# Patient Record
Sex: FEMALE | Race: WHITE | NOT HISPANIC OR LATINO | Employment: FULL TIME | URBAN - METROPOLITAN AREA
[De-identification: names, ages, dates, MRNs, and addresses within clinical notes are randomized per-mention and may not be internally consistent; named-entity substitution may affect disease eponyms.]

---
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
## 2020-08-03 ENCOUNTER — OFFICE VISIT (OUTPATIENT)
Dept: FAMILY MEDICINE | Facility: CLINIC | Age: 20
End: 2020-08-03
Payer: COMMERCIAL

## 2020-08-03 VITALS
RESPIRATION RATE: 15 BRPM | HEIGHT: 68 IN | BODY MASS INDEX: 27.28 KG/M2 | OXYGEN SATURATION: 98 % | TEMPERATURE: 98 F | WEIGHT: 180 LBS | SYSTOLIC BLOOD PRESSURE: 147 MMHG | HEART RATE: 97 BPM | DIASTOLIC BLOOD PRESSURE: 99 MMHG

## 2020-08-03 DIAGNOSIS — Z76.89 ENCOUNTER TO ESTABLISH CARE: Primary | ICD-10-CM

## 2020-08-03 DIAGNOSIS — Z11.59 ENCOUNTER FOR HEPATITIS C SCREENING TEST FOR LOW RISK PATIENT: ICD-10-CM

## 2020-08-03 DIAGNOSIS — Z11.4 SCREENING FOR HIV (HUMAN IMMUNODEFICIENCY VIRUS): ICD-10-CM

## 2020-08-03 DIAGNOSIS — F41.9 ANXIETY: ICD-10-CM

## 2020-08-03 DIAGNOSIS — J32.9 SINUSITIS, UNSPECIFIED CHRONICITY, UNSPECIFIED LOCATION: ICD-10-CM

## 2020-08-03 PROCEDURE — 99204 PR OFFICE/OUTPT VISIT, NEW, LEVL IV, 45-59 MIN: ICD-10-PCS | Mod: 25,S$GLB,, | Performed by: FAMILY MEDICINE

## 2020-08-03 PROCEDURE — 3008F PR BODY MASS INDEX (BMI) DOCUMENTED: ICD-10-PCS | Mod: CPTII,S$GLB,, | Performed by: FAMILY MEDICINE

## 2020-08-03 PROCEDURE — 96372 THER/PROPH/DIAG INJ SC/IM: CPT | Mod: S$GLB,,, | Performed by: FAMILY MEDICINE

## 2020-08-03 PROCEDURE — 96372 PR INJECTION,THERAP/PROPH/DIAG2ST, IM OR SUBCUT: ICD-10-PCS | Mod: S$GLB,,, | Performed by: FAMILY MEDICINE

## 2020-08-03 PROCEDURE — 3008F BODY MASS INDEX DOCD: CPT | Mod: CPTII,S$GLB,, | Performed by: FAMILY MEDICINE

## 2020-08-03 PROCEDURE — 99204 OFFICE O/P NEW MOD 45 MIN: CPT | Mod: 25,S$GLB,, | Performed by: FAMILY MEDICINE

## 2020-08-03 RX ORDER — DESOGESTREL AND ETHINYL ESTRADIOL 0.15-0.03
0.15 KIT ORAL
COMMUNITY
Start: 2019-09-29 | End: 2024-03-11 | Stop reason: ALTCHOICE

## 2020-08-03 RX ORDER — DICLOFENAC SODIUM 75 MG/1
75 TABLET, DELAYED RELEASE ORAL
COMMUNITY
Start: 2019-08-30 | End: 2020-08-29

## 2020-08-03 RX ORDER — CITALOPRAM 10 MG/1
10 TABLET ORAL DAILY
Qty: 30 TABLET | Refills: 11 | Status: SHIPPED | OUTPATIENT
Start: 2020-08-03 | End: 2022-11-07

## 2020-08-03 RX ORDER — DEXAMETHASONE SODIUM PHOSPHATE 4 MG/ML
4 INJECTION, SOLUTION INTRA-ARTICULAR; INTRALESIONAL; INTRAMUSCULAR; INTRAVENOUS; SOFT TISSUE
Status: COMPLETED | OUTPATIENT
Start: 2020-08-03 | End: 2020-08-03

## 2020-08-03 RX ORDER — SULFAMETHOXAZOLE AND TRIMETHOPRIM 800; 160 MG/1; MG/1
1 TABLET ORAL 2 TIMES DAILY
Qty: 14 TABLET | Refills: 0 | Status: SHIPPED | OUTPATIENT
Start: 2020-08-03 | End: 2020-08-10

## 2020-08-03 RX ADMIN — DEXAMETHASONE SODIUM PHOSPHATE 4 MG: 4 INJECTION, SOLUTION INTRA-ARTICULAR; INTRALESIONAL; INTRAMUSCULAR; INTRAVENOUS; SOFT TISSUE at 01:08

## 2020-08-03 NOTE — PROGRESS NOTES
[facility] - Clinic Note    Subjective      Ms. Anderson is a 20 y.o. female who presents to clinic for [facility] Care and Sinusitis (x1d)    Patient presents to establish care.  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  She has a history of chronic sinusitis.  She had sinus surgery 3 years ago to correct her septum.  The last time she had a sinus infection was November of last year.  She denies any cough or shortness of breath.  Since yesterday she has had increased congestion, facial pain, postnasal drip.  Denies any fever.  Has not been taking any allergy medicine.  Has a history of white coat hypertension.  Her blood pressure at home is in the 120s over 70s.  She does have a history of anxiety.  She has never been diagnosed with anxiety but worries constantly about different things.  Over the last few years it has been worsening.  It has been affecting her day-to-day life.    PMH Estela has a past medical history of Allergy.   PSXH Estela has a past surgical history that includes Tonsillectomy and Adenoidectomy.   CARLY Palmer's family history includes Alcohol abuse in her maternal grandmother; Anxiety disorder in her mother; Bipolar disorder in her paternal grandmother; Cancer in her maternal grandfather, other, and other; Diabetes in her other; Fibromyalgia in her maternal grandmother; Heart disease in her other; Hypertension in her mother and paternal grandmother; Meningitis in her maternal grandfather; Mental illness in her paternal grandmother.   KISHA Palmer reports that she has never smoked. She has never used smokeless tobacco. She reports that she does not drink alcohol or use drugs.   DEVENDRA Estela is allergic to clarithromycin and penicillin v.   RAMYA Estela has a current medication list which includes the following prescription(s): desogestrel-ethinyl estradiol, diclofenac, citalopram, and sulfamethoxazole-trimethoprim 800-160mg.     Review of Systems   Constitutional:  "Negative for chills and fever.   HENT: Positive for congestion, postnasal drip and sinus pressure. Negative for rhinorrhea.    Eyes: Negative for visual disturbance.   Respiratory: Negative for cough and shortness of breath.    Cardiovascular: Negative for chest pain.   Gastrointestinal: Negative for abdominal pain, constipation, diarrhea, nausea and vomiting.   Genitourinary: Negative for dysuria.   Musculoskeletal: Negative for myalgias.   Skin: Negative for rash.   Neurological: Negative for weakness and headaches.   Psychiatric/Behavioral: The patient is nervous/anxious.      Objective     BP (!) 147/99 (BP Location: Right arm, Patient Position: Sitting, BP Method: Large (Automatic))   Pulse 97   Temp 98.2 °F (36.8 °C) (Temporal)   Resp 15   Ht 5' 7.5" (1.715 m)   Wt 81.6 kg (180 lb)   LMP 07/20/2020 (Within Days)   SpO2 98%   BMI 27.78 kg/m²     Physical Exam  Vitals signs and nursing note reviewed.   Constitutional:       General: She is not in acute distress.     Appearance: Normal appearance. She is well-developed. She is not diaphoretic.   HENT:      Head: Normocephalic and atraumatic.      Right Ear: External ear normal.      Left Ear: External ear normal.      Nose: Mucosal edema and congestion present.      Right Sinus: Maxillary sinus tenderness present. No frontal sinus tenderness.      Left Sinus: Maxillary sinus tenderness present. No frontal sinus tenderness.   Eyes:      General:         Right eye: No discharge.         Left eye: No discharge.   Cardiovascular:      Rate and Rhythm: Normal rate and regular rhythm.      Heart sounds: Normal heart sounds.   Pulmonary:      Effort: Pulmonary effort is normal.      Breath sounds: Normal breath sounds. No wheezing or rales.   Skin:     General: Skin is warm and dry.   Neurological:      Mental Status: She is alert and oriented to person, place, and time. Mental status is at baseline.   Psychiatric:         Mood and Affect: Mood normal.         " Behavior: Behavior normal.         Thought Content: Thought content normal.         Judgment: Judgment normal.        Assessment/Plan     1. Encounter to establish care     2. Sinusitis, unspecified chronicity, unspecified location  sulfamethoxazole-trimethoprim 800-160mg (BACTRIM DS) 800-160 mg Tab    dexamethasone injection 4 mg   3. Anxiety  T4, free    TSH    Comprehensive metabolic panel    CBC auto differential    citalopram (CELEXA) 10 MG tablet   4. Encounter for hepatitis C screening test for low risk patient  Hepatitis C Antibody   5. Screening for HIV (human immunodeficiency virus)  HIV 1 / 2 ANTIBODY     Patient with symptoms of sinusitis.  Will treat conservatively with fluids, rest, Tylenol, steroid injection today, Flonase.  Prescription for Bactrim a given to patient if symptoms fail to improve.  PHQ 2 score is 0, GAD7 score of 16.  Will trial Celexa to see if that is helpful for patient.  Will check lab work as above to rule out secondary causes of anxiety as well as screening lab work.  Follow-up in about 6 weeks.    Future Appointments   Date Time Provider Department Center   8/31/2020  1:00 PM [facility], LABORATORY [facility] LAB [facility]   9/8/2020  4:00 PM Yariel Verde MD [facility] César Verde MD  Family Medicine  [facility]

## 2020-09-08 ENCOUNTER — TELEPHONE (OUTPATIENT)
Dept: ADMINISTRATIVE | Facility: OTHER | Age: 20
End: 2020-09-08

## 2022-01-11 ENCOUNTER — PATIENT MESSAGE (OUTPATIENT)
Dept: ADMINISTRATIVE | Facility: HOSPITAL | Age: 22
End: 2022-01-11
Payer: COMMERCIAL

## 2022-01-12 DIAGNOSIS — Z11.59 NEED FOR HEPATITIS C SCREENING TEST: ICD-10-CM

## 2022-05-31 ENCOUNTER — PATIENT MESSAGE (OUTPATIENT)
Dept: ADMINISTRATIVE | Facility: HOSPITAL | Age: 22
End: 2022-05-31
Payer: COMMERCIAL

## 2022-09-06 DIAGNOSIS — M54.30 SCIATICA: Primary | ICD-10-CM

## 2023-02-05 ENCOUNTER — OFFICE VISIT (OUTPATIENT)
Dept: URGENT CARE | Facility: CLINIC | Age: 23
End: 2023-02-05
Payer: COMMERCIAL

## 2023-02-05 DIAGNOSIS — R11.0 NAUSEA: Primary | ICD-10-CM

## 2023-02-05 PROCEDURE — 1159F PR MEDICATION LIST DOCUMENTED IN MEDICAL RECORD: ICD-10-PCS | Mod: CPTII,S$GLB,, | Performed by: NURSE PRACTITIONER

## 2023-02-05 PROCEDURE — 99202 OFFICE O/P NEW SF 15 MIN: CPT | Mod: S$GLB,,, | Performed by: NURSE PRACTITIONER

## 2023-02-05 PROCEDURE — 3008F BODY MASS INDEX DOCD: CPT | Mod: CPTII,S$GLB,, | Performed by: NURSE PRACTITIONER

## 2023-02-05 PROCEDURE — 3008F PR BODY MASS INDEX (BMI) DOCUMENTED: ICD-10-PCS | Mod: CPTII,S$GLB,, | Performed by: NURSE PRACTITIONER

## 2023-02-05 PROCEDURE — 1160F RVW MEDS BY RX/DR IN RCRD: CPT | Mod: CPTII,S$GLB,, | Performed by: NURSE PRACTITIONER

## 2023-02-05 PROCEDURE — 3078F DIAST BP <80 MM HG: CPT | Mod: CPTII,S$GLB,, | Performed by: NURSE PRACTITIONER

## 2023-02-05 PROCEDURE — 1160F PR REVIEW ALL MEDS BY PRESCRIBER/CLIN PHARMACIST DOCUMENTED: ICD-10-PCS | Mod: CPTII,S$GLB,, | Performed by: NURSE PRACTITIONER

## 2023-02-05 PROCEDURE — 3074F SYST BP LT 130 MM HG: CPT | Mod: CPTII,S$GLB,, | Performed by: NURSE PRACTITIONER

## 2023-02-05 PROCEDURE — 1159F MED LIST DOCD IN RCRD: CPT | Mod: CPTII,S$GLB,, | Performed by: NURSE PRACTITIONER

## 2023-02-05 PROCEDURE — 3074F PR MOST RECENT SYSTOLIC BLOOD PRESSURE < 130 MM HG: ICD-10-PCS | Mod: CPTII,S$GLB,, | Performed by: NURSE PRACTITIONER

## 2023-02-05 PROCEDURE — 3078F PR MOST RECENT DIASTOLIC BLOOD PRESSURE < 80 MM HG: ICD-10-PCS | Mod: CPTII,S$GLB,, | Performed by: NURSE PRACTITIONER

## 2023-02-05 PROCEDURE — 99202 PR OFFICE/OUTPT VISIT, NEW, LEVL II, 15-29 MIN: ICD-10-PCS | Mod: S$GLB,,, | Performed by: NURSE PRACTITIONER

## 2023-02-07 VITALS
SYSTOLIC BLOOD PRESSURE: 128 MMHG | HEART RATE: 102 BPM | OXYGEN SATURATION: 99 % | BODY MASS INDEX: 32.58 KG/M2 | HEIGHT: 69 IN | DIASTOLIC BLOOD PRESSURE: 78 MMHG | TEMPERATURE: 98 F | WEIGHT: 220 LBS

## 2023-02-07 NOTE — PROGRESS NOTES
"Subjective:       Patient ID: Estela Anderson is a 22 y.o. female.    Vitals:  height is 5' 9" (1.753 m) and weight is 99.8 kg (220 lb). Her oral temperature is 98 °F (36.7 °C). Her blood pressure is 128/78 and her pulse is 102. Her oxygen saturation is 99%.     Chief Complaint: Nausea    Patient reports nausea and vomiting x this morning. Patient states she has vomited 10 times. Patient reports having Influenza A x 3 weeks ago.    Nausea  This is a new problem. The current episode started today. The problem has been gradually worsening. Associated symptoms include nausea and vomiting.     Gastrointestinal:  Positive for nausea and vomiting.         Objective:      Physical Exam   Constitutional: She is oriented to person, place, and time. obesity  HENT:   Head: Normocephalic and atraumatic.   Nose: Nose normal.   Mouth/Throat: Mucous membranes are moist. Oropharynx is clear.   Eyes: Conjunctivae are normal. Extraocular movement intact   Neck: Neck supple.   Cardiovascular: Normal rate, regular rhythm, normal heart sounds and normal pulses.   Pulmonary/Chest: Effort normal and breath sounds normal.   Abdominal: Normal appearance and bowel sounds are normal. She exhibits no distension and no mass. Soft. flat abdomen There is no abdominal tenderness. There is no left CVA tenderness and no right CVA tenderness.   Musculoskeletal: Normal range of motion.         General: Normal range of motion.   Neurological: She is alert and oriented to person, place, and time.   Skin: Skin is warm and dry.   Psychiatric: Her behavior is normal. Mood normal.   Vitals reviewed.      Past medical history and current medications reviewed.       Assessment:           1. Nausea              Plan:         Nausea             There are no Patient Instructions on file for this visit.                         "

## 2023-08-14 ENCOUNTER — OFFICE VISIT (OUTPATIENT)
Dept: ORTHOPEDICS | Facility: CLINIC | Age: 23
End: 2023-08-14
Payer: COMMERCIAL

## 2023-08-14 ENCOUNTER — HOSPITAL ENCOUNTER (OUTPATIENT)
Dept: RADIOLOGY | Facility: HOSPITAL | Age: 23
Discharge: HOME | End: 2023-08-14
Attending: ORTHOPAEDIC SURGERY
Payer: COMMERCIAL

## 2023-08-14 VITALS — BODY MASS INDEX: 32.58 KG/M2 | HEIGHT: 69 IN | WEIGHT: 220 LBS

## 2023-08-14 DIAGNOSIS — M25.511 RIGHT SHOULDER PAIN, UNSPECIFIED CHRONICITY: Primary | ICD-10-CM

## 2023-08-14 DIAGNOSIS — M25.511 RIGHT SHOULDER PAIN: ICD-10-CM

## 2023-08-14 DIAGNOSIS — M25.511 RIGHT SHOULDER PAIN: Primary | ICD-10-CM

## 2023-08-14 PROCEDURE — 99203 OFFICE O/P NEW LOW 30 MIN: CPT | Mod: S$GLB,,, | Performed by: ORTHOPAEDIC SURGERY

## 2023-08-14 PROCEDURE — 1160F RVW MEDS BY RX/DR IN RCRD: CPT | Mod: CPTII,S$GLB,, | Performed by: ORTHOPAEDIC SURGERY

## 2023-08-14 PROCEDURE — 3008F BODY MASS INDEX DOCD: CPT | Mod: CPTII,S$GLB,, | Performed by: ORTHOPAEDIC SURGERY

## 2023-08-14 PROCEDURE — 73030 X-RAY EXAM OF SHOULDER: CPT | Mod: 26,RT,, | Performed by: RADIOLOGY

## 2023-08-14 PROCEDURE — 99203 PR OFFICE/OUTPT VISIT, NEW, LEVL III, 30-44 MIN: ICD-10-PCS | Mod: S$GLB,,, | Performed by: ORTHOPAEDIC SURGERY

## 2023-08-14 PROCEDURE — 1159F PR MEDICATION LIST DOCUMENTED IN MEDICAL RECORD: ICD-10-PCS | Mod: CPTII,S$GLB,, | Performed by: ORTHOPAEDIC SURGERY

## 2023-08-14 PROCEDURE — 99999 PR PBB SHADOW E&M-EST. PATIENT-LVL III: ICD-10-PCS | Mod: PBBFAC,,, | Performed by: ORTHOPAEDIC SURGERY

## 2023-08-14 PROCEDURE — 3008F PR BODY MASS INDEX (BMI) DOCUMENTED: ICD-10-PCS | Mod: CPTII,S$GLB,, | Performed by: ORTHOPAEDIC SURGERY

## 2023-08-14 PROCEDURE — 1159F MED LIST DOCD IN RCRD: CPT | Mod: CPTII,S$GLB,, | Performed by: ORTHOPAEDIC SURGERY

## 2023-08-14 PROCEDURE — 73030 XR SHOULDER TRAUMA 3 VIEW RIGHT: ICD-10-PCS | Mod: 26,RT,, | Performed by: RADIOLOGY

## 2023-08-14 PROCEDURE — 1160F PR REVIEW ALL MEDS BY PRESCRIBER/CLIN PHARMACIST DOCUMENTED: ICD-10-PCS | Mod: CPTII,S$GLB,, | Performed by: ORTHOPAEDIC SURGERY

## 2023-08-14 PROCEDURE — 99999 PR PBB SHADOW E&M-EST. PATIENT-LVL III: CPT | Mod: PBBFAC,,, | Performed by: ORTHOPAEDIC SURGERY

## 2023-08-14 PROCEDURE — 73030 X-RAY EXAM OF SHOULDER: CPT | Mod: TC,PO,RT

## 2023-08-14 NOTE — PROGRESS NOTES
"  23 years old right shoulder pain for 3 months time no trauma can not recall an inciting event stabbing aching pain for on good days 10 on bad days.  Pain nighttime lies on that side.  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  She is tried ice heat and anti-inflammatories and some home exercises without relief.  Patient states that she had a labral tear in her hip which she had "fixed" and patient states that her shoulder pain is much worse than the hip pain that she had    Exam shows cervical motion does not reproduce her symptoms, positive Neer Merritt impingement sign, cuff strength intact, negative apprehension, negative Speed's test     X-rays are negative     Assessment:  Right shoulder pain x3 months subacromial bursitis    Plan:  MRI of the shoulder, follow-up after that to discuss different treatment options  "

## 2023-08-23 ENCOUNTER — HOSPITAL ENCOUNTER (OUTPATIENT)
Dept: RADIOLOGY | Facility: HOSPITAL | Age: 23
Discharge: HOME | End: 2023-08-23
Attending: ORTHOPAEDIC SURGERY
Payer: COMMERCIAL

## 2023-08-23 DIAGNOSIS — M25.511 RIGHT SHOULDER PAIN, UNSPECIFIED CHRONICITY: ICD-10-CM

## 2023-08-23 PROCEDURE — 73221 MRI JOINT UPR EXTREM W/O DYE: CPT | Mod: 26,RT,, | Performed by: RADIOLOGY

## 2023-08-23 PROCEDURE — 73221 MRI SHOULDER WITHOUT CONTRAST RIGHT: ICD-10-PCS | Mod: 26,RT,, | Performed by: RADIOLOGY

## 2023-08-23 PROCEDURE — 73221 MRI JOINT UPR EXTREM W/O DYE: CPT | Mod: TC,PO,RT

## 2023-08-25 ENCOUNTER — OFFICE VISIT (OUTPATIENT)
Dept: ORTHOPEDICS | Facility: CLINIC | Age: 23
End: 2023-08-25
Payer: COMMERCIAL

## 2023-08-25 VITALS — HEIGHT: 69 IN | WEIGHT: 220 LBS | BODY MASS INDEX: 32.58 KG/M2

## 2023-08-25 DIAGNOSIS — M25.511 RIGHT SHOULDER PAIN: Primary | ICD-10-CM

## 2023-08-25 DIAGNOSIS — M25.819 SHOULDER IMPINGEMENT: ICD-10-CM

## 2023-08-25 PROCEDURE — 3008F BODY MASS INDEX DOCD: CPT | Mod: CPTII,S$GLB,, | Performed by: ORTHOPAEDIC SURGERY

## 2023-08-25 PROCEDURE — 3008F PR BODY MASS INDEX (BMI) DOCUMENTED: ICD-10-PCS | Mod: CPTII,S$GLB,, | Performed by: ORTHOPAEDIC SURGERY

## 2023-08-25 PROCEDURE — 20610 LARGE JOINT ASPIRATION/INJECTION: R SUBACROMIAL BURSA: ICD-10-PCS | Mod: RT,S$GLB,, | Performed by: ORTHOPAEDIC SURGERY

## 2023-08-25 PROCEDURE — 99999 PR PBB SHADOW E&M-EST. PATIENT-LVL III: ICD-10-PCS | Mod: PBBFAC,,, | Performed by: ORTHOPAEDIC SURGERY

## 2023-08-25 PROCEDURE — 99999 PR PBB SHADOW E&M-EST. PATIENT-LVL III: CPT | Mod: PBBFAC,,, | Performed by: ORTHOPAEDIC SURGERY

## 2023-08-25 PROCEDURE — 1159F PR MEDICATION LIST DOCUMENTED IN MEDICAL RECORD: ICD-10-PCS | Mod: CPTII,S$GLB,, | Performed by: ORTHOPAEDIC SURGERY

## 2023-08-25 PROCEDURE — 99214 OFFICE O/P EST MOD 30 MIN: CPT | Mod: 25,S$GLB,, | Performed by: ORTHOPAEDIC SURGERY

## 2023-08-25 PROCEDURE — 20610 DRAIN/INJ JOINT/BURSA W/O US: CPT | Mod: RT,S$GLB,, | Performed by: ORTHOPAEDIC SURGERY

## 2023-08-25 PROCEDURE — 99214 PR OFFICE/OUTPT VISIT, EST, LEVL IV, 30-39 MIN: ICD-10-PCS | Mod: 25,S$GLB,, | Performed by: ORTHOPAEDIC SURGERY

## 2023-08-25 PROCEDURE — 1159F MED LIST DOCD IN RCRD: CPT | Mod: CPTII,S$GLB,, | Performed by: ORTHOPAEDIC SURGERY

## 2023-08-25 RX ORDER — TRIAMCINOLONE ACETONIDE 40 MG/ML
80 INJECTION, SUSPENSION INTRA-ARTICULAR; INTRAMUSCULAR
Status: DISCONTINUED | OUTPATIENT
Start: 2023-08-25 | End: 2023-08-25 | Stop reason: HOSPADM

## 2023-08-25 RX ADMIN — TRIAMCINOLONE ACETONIDE 80 MG: 40 INJECTION, SUSPENSION INTRA-ARTICULAR; INTRAMUSCULAR at 08:08

## 2023-08-25 NOTE — PROCEDURES
Large Joint Aspiration/Injection: R subacromial bursa    Date/Time: 8/25/2023 8:15 AM    Performed by: Will Vera MD  Authorized by: Will Vera MD    Consent Done?:  Yes (Verbal)  Site marked: the procedure site was marked    Prep: patient was prepped and draped in usual sterile fashion      Details:  Needle Size:  21 G  Approach:  Posterior  Location:  Shoulder  Site:  R subacromial bursa  Medications:  80 mg triamcinolone acetonide 40 mg/mL  Patient tolerance:  Patient tolerated the procedure well with no immediate complications

## 2023-08-25 NOTE — PROGRESS NOTES
23 years old follow-up of her right shoulder pain been present now for about 3 months time no trauma.  Excruciating pain moving her arm, cervical motion does not reproduce symptoms     Recent MRI shows subacromial bursitis partial cuff tearing no discrete tear     Assessment: Right shoulder subacromial bursitis    Plan: Kenalog injection right shoulder subacromial space, encourage rotator cuff strengthening and balancing exercises, follow-up as needed

## 2023-09-06 DIAGNOSIS — Z11.59 NEED FOR HEPATITIS C SCREENING TEST: ICD-10-CM

## 2023-09-18 ENCOUNTER — OFFICE VISIT (OUTPATIENT)
Dept: FAMILY MEDICINE | Facility: CLINIC | Age: 23
End: 2023-09-18
Payer: COMMERCIAL

## 2023-09-18 VITALS
TEMPERATURE: 97 F | SYSTOLIC BLOOD PRESSURE: 124 MMHG | BODY MASS INDEX: 28.99 KG/M2 | WEIGHT: 195.75 LBS | HEIGHT: 69 IN | OXYGEN SATURATION: 99 % | HEART RATE: 93 BPM | DIASTOLIC BLOOD PRESSURE: 80 MMHG

## 2023-09-18 DIAGNOSIS — F41.1 GAD (GENERALIZED ANXIETY DISORDER): Primary | ICD-10-CM

## 2023-09-18 DIAGNOSIS — R11.2 DRUG-INDUCED NAUSEA AND VOMITING: ICD-10-CM

## 2023-09-18 DIAGNOSIS — T50.905A DRUG-INDUCED NAUSEA AND VOMITING: ICD-10-CM

## 2023-09-18 PROCEDURE — 3079F PR MOST RECENT DIASTOLIC BLOOD PRESSURE 80-89 MM HG: ICD-10-PCS | Mod: CPTII,S$GLB,, | Performed by: NURSE PRACTITIONER

## 2023-09-18 PROCEDURE — 3074F PR MOST RECENT SYSTOLIC BLOOD PRESSURE < 130 MM HG: ICD-10-PCS | Mod: CPTII,S$GLB,, | Performed by: NURSE PRACTITIONER

## 2023-09-18 PROCEDURE — 3079F DIAST BP 80-89 MM HG: CPT | Mod: CPTII,S$GLB,, | Performed by: NURSE PRACTITIONER

## 2023-09-18 PROCEDURE — 99999 PR PBB SHADOW E&M-EST. PATIENT-LVL III: CPT | Mod: PBBFAC,,, | Performed by: NURSE PRACTITIONER

## 2023-09-18 PROCEDURE — 99204 OFFICE O/P NEW MOD 45 MIN: CPT | Mod: S$GLB,,, | Performed by: NURSE PRACTITIONER

## 2023-09-18 PROCEDURE — 3008F PR BODY MASS INDEX (BMI) DOCUMENTED: ICD-10-PCS | Mod: CPTII,S$GLB,, | Performed by: NURSE PRACTITIONER

## 2023-09-18 PROCEDURE — 1159F MED LIST DOCD IN RCRD: CPT | Mod: CPTII,S$GLB,, | Performed by: NURSE PRACTITIONER

## 2023-09-18 PROCEDURE — 1160F PR REVIEW ALL MEDS BY PRESCRIBER/CLIN PHARMACIST DOCUMENTED: ICD-10-PCS | Mod: CPTII,S$GLB,, | Performed by: NURSE PRACTITIONER

## 2023-09-18 PROCEDURE — 1159F PR MEDICATION LIST DOCUMENTED IN MEDICAL RECORD: ICD-10-PCS | Mod: CPTII,S$GLB,, | Performed by: NURSE PRACTITIONER

## 2023-09-18 PROCEDURE — 99204 PR OFFICE/OUTPT VISIT, NEW, LEVL IV, 45-59 MIN: ICD-10-PCS | Mod: S$GLB,,, | Performed by: NURSE PRACTITIONER

## 2023-09-18 PROCEDURE — 99999 PR PBB SHADOW E&M-EST. PATIENT-LVL III: ICD-10-PCS | Mod: PBBFAC,,, | Performed by: NURSE PRACTITIONER

## 2023-09-18 PROCEDURE — 1160F RVW MEDS BY RX/DR IN RCRD: CPT | Mod: CPTII,S$GLB,, | Performed by: NURSE PRACTITIONER

## 2023-09-18 PROCEDURE — 3008F BODY MASS INDEX DOCD: CPT | Mod: CPTII,S$GLB,, | Performed by: NURSE PRACTITIONER

## 2023-09-18 PROCEDURE — 3074F SYST BP LT 130 MM HG: CPT | Mod: CPTII,S$GLB,, | Performed by: NURSE PRACTITIONER

## 2023-09-18 RX ORDER — SEMAGLUTIDE 1.34 MG/ML
1 INJECTION, SOLUTION SUBCUTANEOUS
COMMUNITY
End: 2024-03-11 | Stop reason: ALTCHOICE

## 2023-09-18 RX ORDER — ALPRAZOLAM 0.25 MG/1
0.25 TABLET ORAL 2 TIMES DAILY PRN
Qty: 20 TABLET | Refills: 0 | Status: SHIPPED | OUTPATIENT
Start: 2023-09-18 | End: 2024-03-11 | Stop reason: ALTCHOICE

## 2023-09-18 RX ORDER — SERTRALINE HYDROCHLORIDE 50 MG/1
50 TABLET, FILM COATED ORAL DAILY
Qty: 30 TABLET | Refills: 1 | Status: SHIPPED | OUTPATIENT
Start: 2023-09-18 | End: 2023-10-12

## 2023-09-18 RX ORDER — ONDANSETRON 4 MG/1
4 TABLET, ORALLY DISINTEGRATING ORAL EVERY 8 HOURS PRN
Qty: 30 TABLET | Refills: 0 | Status: SHIPPED | OUTPATIENT
Start: 2023-09-18

## 2023-09-18 NOTE — PROGRESS NOTES
"Subjective:       Patient ID: Estela Anderson is a 23 y.o. female.    Chief Complaint: Anxiety  Pt reports to clinic with chief complaint of worsening anxiety.  Present for months.  Reports "affecting work" notes panic attacks.  Having to call into work.  Using EAP program @ work.  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  Previously tried buspar which caused dizziness, celexa afforded no relief.  Non smoker.  ETOH: 1 drink "every couple of weeks".  LMP: 2 weeks ago.  No SI or HI    Past Medical History:   Diagnosis Date    Allergy     Anxiety       Current Outpatient Medications on File Prior to Visit   Medication Sig Dispense Refill    semaglutide (OZEMPIC) 1 mg/dose (2 mg/1.5 mL) PnIj Inject 1 mg into the skin every 7 days.      desogestreL-ethinyl estradioL (APRI) 0.15-0.03 mg per tablet Take 0.15 mg by mouth.      lisinopriL 10 MG tablet Take 10 mg by mouth once daily.      [DISCONTINUED] busPIRone (BUSPAR) 10 MG tablet Take 10 mg by mouth 2 (two) times daily.      [DISCONTINUED] cetirizine HCl (ZYRTEC ORAL) Take by mouth.       No current facility-administered medications on file prior to visit.        Anxiety  Presents for initial visit. Onset was 6 to 12 months ago. Symptoms include excessive worry, nervous/anxious behavior and panic. Patient reports no insomnia or irritability. Symptoms occur constantly. Nothing aggravates the symptoms. The quality of sleep is fair.     Past treatments include SSRIs. The treatment provided no relief.     Review of Systems   Constitutional: Negative.  Negative for irritability.   HENT: Negative.     Respiratory: Negative.     Cardiovascular: Negative.    Gastrointestinal: Negative.    Genitourinary: Negative.    Musculoskeletal: Negative.    Skin: Negative.    Neurological: Negative.    Psychiatric/Behavioral:  The patient is nervous/anxious. The patient does not have insomnia.        Objective:      Physical Exam  Vitals reviewed.   Constitutional:       Appearance: She is " well-developed. She is obese.   HENT:      Head: Normocephalic.      Mouth/Throat:      Pharynx: No oropharyngeal exudate.   Eyes:      Pupils: Pupils are equal, round, and reactive to light.   Neck:      Vascular: No JVD.      Trachea: No tracheal deviation.   Cardiovascular:      Rate and Rhythm: Normal rate and regular rhythm.      Heart sounds: Normal heart sounds. No murmur heard.  Pulmonary:      Effort: Pulmonary effort is normal. No respiratory distress.      Breath sounds: Normal breath sounds.   Abdominal:      General: Bowel sounds are normal. There is no distension.      Palpations: Abdomen is soft.      Tenderness: There is no abdominal tenderness.   Musculoskeletal:         General: Normal range of motion.      Cervical back: Normal range of motion.   Skin:     General: Skin is warm and dry.   Neurological:      Mental Status: She is alert and oriented to person, place, and time.      Deep Tendon Reflexes: Reflexes are normal and symmetric.   Psychiatric:         Speech: Speech normal.         Behavior: Behavior normal.         Assessment:       1. SYD (generalized anxiety disorder)        Plan:   SYD (generalized anxiety disorder)  -     sertraline (ZOLOFT) 50 MG tablet; Take 1 tablet (50 mg total) by mouth once daily.  Dispense: 30 tablet; Refill: 1  S/e discussed. Verbalized understanding; VV in 4 weeks  Continue EAP    No follow-ups on file.

## 2023-10-10 DIAGNOSIS — F41.1 GAD (GENERALIZED ANXIETY DISORDER): ICD-10-CM

## 2023-10-12 RX ORDER — SERTRALINE HYDROCHLORIDE 50 MG/1
50 TABLET, FILM COATED ORAL
Qty: 90 TABLET | Refills: 1 | Status: SHIPPED | OUTPATIENT
Start: 2023-10-12 | End: 2024-03-11 | Stop reason: ALTCHOICE

## 2023-10-18 ENCOUNTER — TELEPHONE (OUTPATIENT)
Dept: FAMILY MEDICINE | Facility: CLINIC | Age: 23
End: 2023-10-18
Payer: COMMERCIAL

## 2023-11-13 ENCOUNTER — OFFICE VISIT (OUTPATIENT)
Dept: ORTHOPEDICS | Facility: CLINIC | Age: 23
End: 2023-11-13
Payer: COMMERCIAL

## 2023-11-13 VITALS — HEIGHT: 69 IN | WEIGHT: 195 LBS | BODY MASS INDEX: 28.88 KG/M2

## 2023-11-13 DIAGNOSIS — M25.511 ACUTE PAIN OF RIGHT SHOULDER: ICD-10-CM

## 2023-11-13 DIAGNOSIS — S43.431A LABRAL TEAR OF SHOULDER, RIGHT, INITIAL ENCOUNTER: Primary | ICD-10-CM

## 2023-11-13 PROCEDURE — 99214 PR OFFICE/OUTPT VISIT, EST, LEVL IV, 30-39 MIN: ICD-10-PCS | Mod: S$GLB,,, | Performed by: SURGERY

## 2023-11-13 PROCEDURE — 99214 OFFICE O/P EST MOD 30 MIN: CPT | Mod: S$GLB,,, | Performed by: SURGERY

## 2023-11-13 PROCEDURE — 99999 PR PBB SHADOW E&M-EST. PATIENT-LVL III: CPT | Mod: PBBFAC,,, | Performed by: SURGERY

## 2023-11-13 PROCEDURE — 99999 PR PBB SHADOW E&M-EST. PATIENT-LVL III: ICD-10-PCS | Mod: PBBFAC,,, | Performed by: SURGERY

## 2023-11-13 PROCEDURE — 3008F PR BODY MASS INDEX (BMI) DOCUMENTED: ICD-10-PCS | Mod: CPTII,S$GLB,, | Performed by: SURGERY

## 2023-11-13 PROCEDURE — 1159F MED LIST DOCD IN RCRD: CPT | Mod: CPTII,S$GLB,, | Performed by: SURGERY

## 2023-11-13 PROCEDURE — 1159F PR MEDICATION LIST DOCUMENTED IN MEDICAL RECORD: ICD-10-PCS | Mod: CPTII,S$GLB,, | Performed by: SURGERY

## 2023-11-13 PROCEDURE — 3008F BODY MASS INDEX DOCD: CPT | Mod: CPTII,S$GLB,, | Performed by: SURGERY

## 2023-11-13 NOTE — PROGRESS NOTES
Subjective:     Estela Anderson     Chief Complaint   Patient presents with    Right Shoulder - Pain       HPI    Estela is a 23 y.o. female coming in today for right shoulder pain.  She is right-hand dominant.  She is a nurse in the postanesthesia care unit.  She states that she is been having pain and difficulty with movement of her arm.  She is previously seen another provider who gave her an injection, ordered an MRI without contrast which revealed some signs of impingement and an irregularity of the labrum as well as some cysts changes in the humeral head.  She presents here for further consultation.  She does note that she had labrum surgery on the hip and this feels like a similar type of pain to that.  She does endorses joint instability.  It is affecting her activities of daily living.  She does endorse mechanical symptoms.      ROS    PAST MEDICAL HISTORY:   Past Medical History:   Diagnosis Date    Allergy     Anxiety      PAST SURGICAL HISTORY:   Past Surgical History:   Procedure Laterality Date    ADENOIDECTOMY      sinus sx      TONSILLECTOMY       FAMILY HISTORY:   Family History   Problem Relation Age of Onset    Cancer Maternal Grandfather         prostate cancer    Meningitis Maternal Grandfather     Bipolar disorder Paternal Grandmother     Hypertension Paternal Grandmother     Mental illness Paternal Grandmother         bipolar    Hypertension Mother     Anxiety disorder Mother     Fibromyalgia Maternal Grandmother     Alcohol abuse Maternal Grandmother     Diabetes Other         T2DM    Heart disease Other         AMI death >51yo    Cancer Other     Cancer Other         colon cancer     SOCIAL HISTORY:   Social History     Socioeconomic History    Marital status: Single   Tobacco Use    Smoking status: Never     Passive exposure: Never    Smokeless tobacco: Never   Substance and Sexual Activity    Alcohol use: No    Drug use: No    Sexual activity: Never   Social History Narrative     "ALL:Penicillin    PMH:T&A , rare OM, last in 07, scarlet fever in 04    FH:Mother has a mild generalized anxiety disorder and hypertension; PGM has HTN and bipolar disease, PGGM had late onset diabetes mellitus; PGGF had AMI after 50; MGM has fibromyalgia, is a smoker and has alcoholism; MGF has prostate cancer; MGGF had colon cancer; MGGM  of an unknown cancer    SH:Intact family,  younger sibling, no smokers, one dog    School: A student, swim team                                       MEDICATIONS:   Current Outpatient Medications:     ondansetron (ZOFRAN-ODT) 4 MG TbDL, Take 1 tablet (4 mg total) by mouth every 8 (eight) hours as needed (n/v)., Disp: 30 tablet, Rfl: 0    semaglutide (OZEMPIC) 1 mg/dose (2 mg/1.5 mL) PnIj, Inject 1 mg into the skin every 7 days., Disp: , Rfl:     sertraline (ZOLOFT) 50 MG tablet, TAKE 1 TABLET BY MOUTH EVERY DAY, Disp: 90 tablet, Rfl: 1    ALPRAZolam (XANAX) 0.25 MG tablet, Take 1 tablet (0.25 mg total) by mouth 2 (two) times daily as needed for Anxiety., Disp: 20 tablet, Rfl: 0    desogestreL-ethinyl estradioL (APRI) 0.15-0.03 mg per tablet, Take 0.15 mg by mouth., Disp: , Rfl:     lisinopriL 10 MG tablet, Take 10 mg by mouth once daily., Disp: , Rfl:   ALLERGIES:   Review of patient's allergies indicates:   Allergen Reactions    Clarithromycin Nausea Only    Penicillin v Hives       Objective:     VITAL SIGNS: Ht 5' 9" (1.753 m)   Wt 88.5 kg (195 lb)   BMI 28.80 kg/m²    Ortho/SPM Exam    MUSCULOSKELETAL EXAM  Shoulder: right SHOULDER  The affected shoulder is compared to the contralateral shoulder.  Positive findings otherwise noted at the bottom of the exam.    Observation:      SHOULDER  No ecchymosis, edema, or erythema throughout the shoulder girdle.  No sternal, clavicular, or acromial deformities bilaterally.  No atrophy of the pectorals, deltoids, supraspinatus, infraspinatus, or biceps bilaterally.  No asymmetry of shoulders bilaterally.    ROM (* = with " pain):  CERVICAL SPINE  Full AROM in flexion, extension, sidebending, and rotation.    SHOULDER  Active flexion to 180° on left and 180° on right.  Active abduction to 180° on left and 180° on right.  Active internal rotation to T7 on left and T7 on right.    Active external rotation to T4 on left and T4 on right.    No scapular dyskinesia or winging.    Tenderness:  No tenderness at the SC or AC joint  No tenderness over the clavicle   No tenderness over biceps tendon or bicipital groove  No tenderness over subacromial space    Strength Testing (* = with pain):  Deltoid - 5/5 on left and 5/5 on right  Biceps - 5/5 on left and 5/5 on right  Triceps - 5/5 on left and 5/5 on right  Wrist extension - 5/5 on left and 5/5 on right  Wrist flexion - 5/5 on left and 5/5 on right   - 5/5 on left and 5/5 on right  Finger extension - 5/5 on left and 5/5 on right  Finger abduction - 5/5 on left and 5/5 on right    Special Tests:  Empty can test - negative  Full can test - negative  Bear hug test - negative  Belly press test - negative  Resisted internal rotation - negative  Resisted external rotation - negative    Neer's test - negative  Hawkin's-Henry test - negative  Cross-arm test- negative    OMosess test - negative  Biceps load 1 test - negative  Biceps load 2 test - negative  Campos sheer test - negative    Sulcus sign - none  AP load and shift laxity - none  Anterior apprehension test - negative  Posterior apprehension test - negative    Speed's test - negative  Yergason's test - negative    Neurovascular Exam:  Spurlings test - negative bialterally  Lhermittes test - negative  2+ radial pulses bilaterally  Sensation intact to light touch in the distal median, radial, and ulnar nerve distributions bilaterally.  Negative Tinel's at carpal tunnel  Negative Tinel's at cubital tunnel  2+/4 reflexes at triceps, biceps, and brachioradialis  Capillary refill intact <2 seconds in all digits bilaterally    Full range of  motion of the cervical/thoracic spine in all planes without pain    Positive findings:  Decreased range of motion, positive Neer's, positive Merritt, positive Patience's, positive belly press, positive cross-body adduction, positive apprehension, positive Suri, positive jerk.    IMAGING:    X-ray images were reviewed personally by me and then directly with patient.  Previous images reviewed.  Three views of the right shoulder as well as MRI of the right shoulder without contrast.  No obvious osseous abnormality on x-ray, MRI with possible subacromial bursitis and impingement, irregularity of the labrum anteriorly.  Cysts in posterior lateral humeral head     Assessment:      Encounter Diagnoses   Name Primary?    Acute pain of right shoulder     Labral tear of shoulder, right, initial encounter Yes          Plan:   -MR arthrogram ordered  -PT  -Follow-up after MR arthrogram    Patient agreeable to today's plan and all questions were answered    This note is dictated using the M*Modal Fluency Direct word recognition program. There are word recognition mistakes that are occasionally missed on review.

## 2023-11-14 ENCOUNTER — OFFICE VISIT (OUTPATIENT)
Dept: URGENT CARE | Facility: CLINIC | Age: 23
End: 2023-11-14
Payer: COMMERCIAL

## 2023-11-14 VITALS
RESPIRATION RATE: 16 BRPM | SYSTOLIC BLOOD PRESSURE: 153 MMHG | OXYGEN SATURATION: 100 % | WEIGHT: 190.81 LBS | HEIGHT: 70 IN | DIASTOLIC BLOOD PRESSURE: 89 MMHG | HEART RATE: 84 BPM | BODY MASS INDEX: 27.32 KG/M2 | TEMPERATURE: 98 F

## 2023-11-14 DIAGNOSIS — J02.9 SORE THROAT: ICD-10-CM

## 2023-11-14 DIAGNOSIS — J06.9 VIRAL URI WITH COUGH: Primary | ICD-10-CM

## 2023-11-14 DIAGNOSIS — R09.82 POST-NASAL DRIP: ICD-10-CM

## 2023-11-14 DIAGNOSIS — R09.81 NASAL CONGESTION: ICD-10-CM

## 2023-11-14 PROCEDURE — 99213 OFFICE O/P EST LOW 20 MIN: CPT | Mod: S$GLB,,, | Performed by: NURSE PRACTITIONER

## 2023-11-14 PROCEDURE — 99213 PR OFFICE/OUTPT VISIT, EST, LEVL III, 20-29 MIN: ICD-10-PCS | Mod: S$GLB,,, | Performed by: NURSE PRACTITIONER

## 2023-11-14 RX ORDER — BENZONATATE 100 MG/1
200 CAPSULE ORAL 3 TIMES DAILY PRN
Qty: 30 CAPSULE | Refills: 0 | Status: SHIPPED | OUTPATIENT
Start: 2023-11-14 | End: 2023-11-24

## 2023-11-14 RX ORDER — BENZONATATE 100 MG/1
100 CAPSULE ORAL 3 TIMES DAILY PRN
Qty: 30 CAPSULE | Refills: 0 | Status: SHIPPED | OUTPATIENT
Start: 2023-11-14 | End: 2023-11-14 | Stop reason: SDUPTHER

## 2023-11-14 RX ORDER — PROMETHAZINE HYDROCHLORIDE AND DEXTROMETHORPHAN HYDROBROMIDE 6.25; 15 MG/5ML; MG/5ML
5 SYRUP ORAL EVERY 6 HOURS PRN
Qty: 118 ML | Refills: 0 | Status: SHIPPED | OUTPATIENT
Start: 2023-11-14 | End: 2023-11-21

## 2023-11-14 NOTE — PROGRESS NOTES
"Subjective:      Patient ID: Estela Anderson is a 23 y.o. female.    Vitals:  height is 5' 10.12" (1.781 m) and weight is 86.5 kg (190 lb 12.9 oz). Her oral temperature is 98.2 °F (36.8 °C). Her blood pressure is 153/89 (abnormal) and her pulse is 84. Her respiration is 16 and oxygen saturation is 100%.     Chief Complaint: Cough    23 year old female presents for evaluation of productive cough, nasal congestion and a scratchy throat x 6 days. Symptom onset Wednesday/Thursday. Reports worsening cough since onset. OTC  Emergen C, Dayquil, Ibuprofen and Robitussin without relief. Reports recent exposure to someone with similar symptoms. (-) home Covid test with onset    Cough  This is a new problem. The current episode started in the past 7 days. The problem has been gradually worsening. The problem occurs every few minutes. The cough is Productive of sputum. Associated symptoms include chills (x 1 day with onset then resolved), a fever (100.1 on day 1 with onset, now resolved), headaches, myalgias, nasal congestion, postnasal drip, a sore throat and shortness of breath. Pertinent negatives include no chest pain, ear congestion, ear pain, heartburn, hemoptysis, rash, rhinorrhea, sweats, weight loss or wheezing. The symptoms are aggravated by lying down. She has tried OTC cough suppressant (Dayquil, Ibuprofen, Robitussin) for the symptoms. The treatment provided no relief. Her past medical history is significant for bronchitis and environmental allergies. There is no history of asthma, bronchiectasis, COPD, emphysema or pneumonia.       Constitution: Positive for chills (x 1 day with onset then resolved), sweating (x 1 day with onset now resolved), fatigue and fever (100.1 on day 1 with onset, now resolved). Negative for appetite change.   HENT:  Positive for postnasal drip and sore throat. Negative for ear pain and congestion.    Neck: neck negative.   Cardiovascular: Negative.  Negative for chest pain.   Eyes: " Negative.    Respiratory:  Positive for cough, sputum production and shortness of breath. Negative for bloody sputum and wheezing.    Gastrointestinal:  Negative for heartburn.   Endocrine: negative.   Genitourinary: Negative.    Musculoskeletal:  Positive for muscle ache.   Skin: Negative.  Negative for rash.   Allergic/Immunologic: Positive for environmental allergies.   Neurological:  Positive for headaches.   Hematologic/Lymphatic: Negative.    Psychiatric/Behavioral: Negative.        Objective:     Physical Exam   Constitutional: She is oriented to person, place, and time. She appears well-developed. She is cooperative.  Non-toxic appearance. She does not appear ill. No distress. normalawake  HENT:   Head: Normocephalic and atraumatic.   Ears:   Right Ear: Hearing, tympanic membrane, external ear and ear canal normal. No cerumen not present. Tympanic membrane is not injected, not scarred, not perforated, not erythematous, not retracted and not bulging. impacted cerumen  Left Ear: Hearing, tympanic membrane, external ear and ear canal normal. No cerumen not present. Tympanic membrane is not injected, not scarred, not perforated, not erythematous, not retracted and not bulging. impacted cerumen  Nose: Congestion present. No mucosal edema, rhinorrhea or nasal deformity. No epistaxis. Right sinus exhibits no maxillary sinus tenderness and no frontal sinus tenderness. Left sinus exhibits no maxillary sinus tenderness and no frontal sinus tenderness.   Mouth/Throat: Uvula is midline, oropharynx is clear and moist and mucous membranes are normal. Mucous membranes are moist. No trismus in the jaw. Normal dentition. No uvula swelling. Cobblestoning present. No oropharyngeal exudate, posterior oropharyngeal edema or posterior oropharyngeal erythema. Tonsils are 0 on the right. Tonsils are 0 on the left. No tonsillar exudate.   Eyes: Conjunctivae and lids are normal. Pupils are equal, round, and reactive to light. Right  eye exhibits no discharge. Left eye exhibits no discharge. No scleral icterus. Extraocular movement intact   Neck: Trachea normal and phonation normal. Neck supple. No edema present. No erythema present. No neck rigidity present.   Cardiovascular: Normal rate, regular rhythm, normal heart sounds and normal pulses.   Pulmonary/Chest: Effort normal and breath sounds normal. No stridor. No respiratory distress. She has no decreased breath sounds. She has no wheezes. She has no rhonchi. She has no rales. She exhibits no tenderness.   Abdominal: Normal appearance.   Musculoskeletal: Normal range of motion.         General: No deformity. Normal range of motion.   Neurological: no focal deficit. She is alert and oriented to person, place, and time. She exhibits normal muscle tone. Coordination normal.   Skin: Skin is warm, dry, intact, not diaphoretic and not pale.   Psychiatric: Her speech is normal and behavior is normal. Mood, judgment and thought content normal.   Nursing note and vitals reviewed.      Assessment:     1. Viral URI with cough    2. Nasal congestion    3. Post-nasal drip    4. Sore throat        Plan:     Patient presents with symptoms that are consistent with acute viral illness. Exam negative for otitis media/sinusitis/bacterial tonsillitis/bronchitis/ pneumonia. Plan is to manage symptoms and prevent worsening. Discussed with patient who verbalizes understanding.       Viral URI with cough  -     Discontinue: benzonatate (TESSALON) 100 MG capsule; Take 1 capsule (100 mg total) by mouth 3 (three) times daily as needed for Cough.  Dispense: 30 capsule; Refill: 0  -     benzonatate (TESSALON) 100 MG capsule; Take 2 capsules (200 mg total) by mouth 3 (three) times daily as needed for Cough.  Dispense: 30 capsule; Refill: 0  -     promethazine-dextromethorphan (PROMETHAZINE-DM) 6.25-15 mg/5 mL Syrp; Take 5 mLs by mouth every 6 (six) hours as needed (cough).  Dispense: 118 mL; Refill: 0    Nasal  congestion    Post-nasal drip    Sore throat                Patient Instructions   Rest  Hydration/increase fluids  Warm salt water gargles  Warm tea with lemon and honey  Emergen C or airborne OTC as directed for immune support  Claritin D OTC as directed for nasal congestion/post nasal drip  Tessalon perles 3 times daily as needed for cough  Phenergan DM every 6 hours as needed for cough  Typical course and duration of illness discussed  Signs and symptoms of worsening discussed  Follow up as needed/with worsening

## 2023-11-14 NOTE — PATIENT INSTRUCTIONS
Rest  Hydration/increase fluids  Warm salt water gargles  Warm tea with lemon and honey  Emergen C or airborne OTC as directed for immune support  Claritin D OTC as directed for nasal congestion/post nasal drip  Tessalon perles 3 times daily as needed for cough  Phenergan DM every 6 hours as needed for cough  Typical course and duration of illness discussed  Signs and symptoms of worsening discussed  Follow up as needed/with worsening

## 2023-11-15 ENCOUNTER — PATIENT MESSAGE (OUTPATIENT)
Dept: ORTHOPEDICS | Facility: CLINIC | Age: 23
End: 2023-11-15
Payer: COMMERCIAL

## 2023-11-16 ENCOUNTER — TELEPHONE (OUTPATIENT)
Dept: INTERVENTIONAL RADIOLOGY/VASCULAR | Facility: CLINIC | Age: 23
End: 2023-11-16
Payer: COMMERCIAL

## 2023-12-01 ENCOUNTER — HOSPITAL ENCOUNTER (OUTPATIENT)
Dept: RADIOLOGY | Facility: HOSPITAL | Age: 23
Discharge: HOME | End: 2023-12-01
Attending: SURGERY
Payer: COMMERCIAL

## 2023-12-01 DIAGNOSIS — S43.431A LABRAL TEAR OF SHOULDER, RIGHT, INITIAL ENCOUNTER: ICD-10-CM

## 2023-12-01 DIAGNOSIS — M25.511 ACUTE PAIN OF RIGHT SHOULDER: ICD-10-CM

## 2023-12-01 PROCEDURE — 25500020 PHARM REV CODE 255: Performed by: SURGERY

## 2023-12-01 PROCEDURE — 73222 MRI JOINT UPR EXTREM W/DYE: CPT | Mod: TC,RT

## 2023-12-01 PROCEDURE — 77002 NEEDLE LOCALIZATION BY XRAY: CPT | Mod: 26,RT,, | Performed by: INTERNAL MEDICINE

## 2023-12-01 PROCEDURE — 77002 XR ARTHROGRAM SHOULDER RIGHT, INJECTION ONLY WITH MRI TO FOLLOW (XPD): ICD-10-PCS | Mod: 26,RT,, | Performed by: INTERNAL MEDICINE

## 2023-12-01 PROCEDURE — 23350 XR ARTHROGRAM SHOULDER RIGHT, INJECTION ONLY WITH MRI TO FOLLOW (XPD): ICD-10-PCS | Mod: RT,,, | Performed by: INTERNAL MEDICINE

## 2023-12-01 PROCEDURE — 73222 MRI SHOULDER WITH CONTRAST RIGHT: ICD-10-PCS | Mod: 26,RT,, | Performed by: RADIOLOGY

## 2023-12-01 PROCEDURE — A9585 GADOBUTROL INJECTION: HCPCS | Performed by: SURGERY

## 2023-12-01 PROCEDURE — 23350 INJECTION FOR SHOULDER X-RAY: CPT | Mod: RT,,, | Performed by: INTERNAL MEDICINE

## 2023-12-01 PROCEDURE — 73222 MRI JOINT UPR EXTREM W/DYE: CPT | Mod: 26,RT,, | Performed by: RADIOLOGY

## 2023-12-01 PROCEDURE — 25000003 PHARM REV CODE 250: Performed by: SURGERY

## 2023-12-01 RX ORDER — LIDOCAINE HYDROCHLORIDE 10 MG/ML
2 INJECTION INFILTRATION; PERINEURAL ONCE
Status: COMPLETED | OUTPATIENT
Start: 2023-12-01 | End: 2023-12-01

## 2023-12-01 RX ORDER — GADOBUTROL 604.72 MG/ML
7 INJECTION INTRAVENOUS
Status: COMPLETED | OUTPATIENT
Start: 2023-12-01 | End: 2023-12-01

## 2023-12-01 RX ADMIN — IOHEXOL 9 ML: 300 INJECTION, SOLUTION INTRAVENOUS at 03:12

## 2023-12-01 RX ADMIN — GADOBUTROL 7 ML: 604.72 INJECTION INTRAVENOUS at 03:12

## 2023-12-01 RX ADMIN — LIDOCAINE HYDROCHLORIDE 2 ML: 10 INJECTION, SOLUTION INFILTRATION; PERINEURAL at 03:12

## 2023-12-04 ENCOUNTER — PATIENT MESSAGE (OUTPATIENT)
Dept: ORTHOPEDICS | Facility: CLINIC | Age: 23
End: 2023-12-04
Payer: COMMERCIAL

## 2023-12-07 ENCOUNTER — CLINICAL SUPPORT (OUTPATIENT)
Dept: REHABILITATION | Facility: HOSPITAL | Age: 23
End: 2023-12-07
Attending: SURGERY
Payer: COMMERCIAL

## 2023-12-07 DIAGNOSIS — S43.431A LABRAL TEAR OF SHOULDER, RIGHT, INITIAL ENCOUNTER: ICD-10-CM

## 2023-12-07 DIAGNOSIS — G89.29 CHRONIC RIGHT SHOULDER PAIN: Primary | ICD-10-CM

## 2023-12-07 DIAGNOSIS — M25.511 CHRONIC RIGHT SHOULDER PAIN: Primary | ICD-10-CM

## 2023-12-07 PROCEDURE — 97110 THERAPEUTIC EXERCISES: CPT

## 2023-12-07 PROCEDURE — 97140 MANUAL THERAPY 1/> REGIONS: CPT

## 2023-12-07 PROCEDURE — 97530 THERAPEUTIC ACTIVITIES: CPT

## 2023-12-07 PROCEDURE — 97161 PT EVAL LOW COMPLEX 20 MIN: CPT

## 2023-12-07 NOTE — PLAN OF CARE
[facility]   Physical Therapy Initial Evaluation      Name: Estela Anderson  [facility] Number: [id number]    Therapy Diagnosis:   Encounter Diagnoses   Name Primary?    Chronic right shoulder pain Yes    Labral tear of shoulder, right, initial encounter         Physician: Jose David Blue MD    Physician Orders: PT Eval and Treat  Medical Diagnosis from Referral: S43.431A (ICD-10-CM) - Labral tear of shoulder, right, initial encounter  Evaluation Date: 12/7/2023  Authorization Period Expiration: 11/12/2024  Plan of Care Expiration: 3/29/2024  Visit # / Visits authorized: 1/ 1   FOTO #2:   FOTO: #3:     Precautions: Standard    Time In: 1400  Time Out: 1504  Total Appointment Time (timed & untimed codes): 64 minutes    SUBJECTIVE   Date of onset: August 2023    History of current condition - Estela reports: ongoing right shoulder pain beginning in August 2023. Insidious onset with no prior trauma, injuries or specific mechanism of injury. Some mechanical symptoms. Denies any feelings of instability. Some paresthesia in the ulnar distribution of the right upper extremity. No similar symptoms on the left shoulder. Most pain with overhead reaching and lifting.   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 No history of overhead sports. Participated in dace in college at [employer].    Falls: None.    Imaging, MRI studies (12/1/2023):  Posterior labral tear extending from superior to inferior.  Patulous capsule.    Prior Therapy: None  Social History: lives with boyfriend  Occupation: Nurse  Prior Level of Function: no pain or difficulties with work duties and recreational activities.  Current Level of Function: moderate to severe pain and difficulties with work duties and recreational activities.    Pain:  Current 0/10, worst 10/10, best 0/10   Location: Right Shoulder  Description: Shooting and sharp, and catching  Aggravating Factors: reaching,  lifting, overhead activities, hair care, dressing.  Easing Factors: rest and medication    Patients goals: Restore normal mobility and function within the right shoulder.     Medical History:   Past Medical History:   Diagnosis Date    Allergy     Anxiety      Surgical History:   Estela Anderson  has a past surgical history that includes Tonsillectomy; Adenoidectomy; and sinus sx.    Medications:   Estela has a current medication list which includes the following prescription(s): alprazolam, desogestrel-ethinyl estradiol, lisinopril, ondansetron, ozempic, and sertraline.    Allergies:   Review of patient's allergies indicates:   Allergen Reactions    Clarithromycin Nausea Only    Penicillin v Hives        OBJECTIVE     Observations:  Cervical: forward head and rounded shoulders  Thoracic: upper thoracic kyphosis  Lumbar: increased lordosis  Scapulae: elevated right; bilateral tipping and abduction      Active Range of Motion:  Shoulder   Action Right* Left   Flexion 160 degrees 180 degrees   Abduction 160 degrees 180 degrees   External Rotation 75 degrees 95 degrees   Internal Rotation 65 degrees 75 degrees   *Denotes pain with motion*    Passive Range of Motion:  Shoulder   Action Right Left   Flexion 185 degrees 190 degrees   Abduction 180 degrees 190 degrees   External Rotation 105 degrees 105 degrees   Internal Rotation 65 degrees 85 degrees       Manual Muscle Testing:  Shoulder   Action Right Left   Flexion 3+ / 5 4+ / 5   Abduction 3+ / 5 4+ / 5   External Rotation 3 / 5 4 / 5   Internal Rotation 3 / 5 4 / 5       Special Tests:  Positive (+) Tests  Sulcus Sign right = 1 finger width  Anterior load and shift test right  ULTT 3 and 4  Anterior Apprehension for pain  Scapular Assistance - upwards rotation and posterior tilt      Functional Movement & Mechanics:  Left cervical sidebend with Left shoulder flexion  Ipsilateral sidebend with cervical rotation (bilateral)  Increased right glenohumeral anterior glide  with active shoulder motion      Additional Assessment:  Hypermobility with right glenohumeral anterior glide  Hypermobile right AC joint with concurrent tenderness to palpation  Hypomobility with CT junction extension and upper cervical flexion      Intake Outcome Measure for FOTO Shoulder Survey    Therapist reviewed FOTO scores for Estela Anderson on 12/7/2023.   FOTO documents entered into [employer] - see Media section.    Intake Score: 44% > 71% (13 visits)       TREATMENT     Total Treatment time (time-based codes) separate from Evaluation: 26 minutes      Estela received the treatments listed below:      Therapeutic Exercises to develop strength and endurance for 8 minutes including:  Prone Shoulder internal rotation - x15  Theraband Walkouts - internal rotation and external rotation; green theraband      Manual Therapy Techniques: Joint mobilizations were applied to the: Right Shoulder for 8 minutes, including:  Chin pivot CT junction manipulation  Right shoulder internal rotation muscle energy technique - x3      Therapeutic Activities to improve functional performance for 10 minutes, including:  Education (see below)  Questions and answers    PATIENT EDUCATION AND HOME EXERCISES     Education provided:   Anatomy and Pathology.  Symptom management and plan of care progressions.  Home Exercise Program.    Written Home Exercises Provided: yes. Exercises were reviewed and Estela was able to demonstrate them prior to the end of the session.  Estela demonstrated good  understanding of the education provided. See EMR under Patient Instructions for exercises provided during therapy sessions.    ASSESSMENT     Etsela is a 23 y.o. female referred to outpatient Physical Therapy with a medical diagnosis of Labral tear of shoulder, right, initial encounter. Patient presents with high tissue irritability, decreased range of motion, decreased strength, postural deviations, adverse neural tension, joint hypermobility, and  biomechanical faults with functional movement patterns. Signs and symptoms are consistent with the above medial diagnosis. Patient presents with anterior glide medial rotation syndrome of the right shoulder with concurrent adverse neural tension of the radial nerve. She will benefit from skilled physical therapy addressing her shoulder range of motion, spinal mobility, periscapular strengthening, dynamic rotator cuff strengthening, and proprioceptive retraining.    Patient prognosis is Good.   Patientt will benefit from skilled outpatient Physical Therapy to address the deficits stated above and in the chart below, provide patient /family education, and to maximize patientt's level of independence.     Plan of care discussed with patient: Yes  Patient's spiritual, cultural and educational needs considered and patient is agreeable to the plan of care and goals as stated below:     Anticipated Barriers for therapy: chronicity of symptoms.    Medical Necessity is demonstrated by the following  History  Co-morbidities and personal factors that may impact the plan of care [x] LOW: no personal factors / co-morbidities  [] MODERATE: 1-2 personal factors / co-morbidities  [] HIGH: 3+ personal factors / co-morbidities    Moderate / High Support Documentation:   Co-morbidities affecting plan of care: None    Personal Factors:   no deficits     Examination  Body Structures and Functions, activity limitations and participation restrictions that may impact the plan of care [] LOW: addressing 1-2 elements  [] MODERATE: 3+ elements  [x] HIGH: 4+ elements (please support below)    Moderate / High Support Documentation: reaching, lifting, carrying, dressing, hair care, work duties, driving, overhead activities.     Clinical Presentation [] LOW: stable  [x] MODERATE: Evolving  [] HIGH: Unstable     Decision Making/ Complexity Score: moderate     Goals:  Short Term Goals: 3-4 weeks   Patient will have reduced pain complaints from  10/10 to less than or equal to 6/10.   Patient will demonstrate increased AROM/PROM by approximately 25% to 50% of initial measurements.   Patient will demonstrate increased muscle strength of at least one-half grade as compared to the initial measurements.     Long Term Goals: 6-8 weeks   Patient will have reduced pain complaints from 6/10 to less than or equal to 2/10.   Patient will demonstrate increased AROM/PROM by approximately 75% to 100% of initial measurements.   Patient will demonstrate increased muscle strength of at least one grade as compared to the initial measurements. Patient will improve Shoulder FOTO Intake score from 44% to greater than or equal to 71%.   Patient will be independent with their home exercise program.     PLAN   Plan of care Certification: 12/7/2023 to 3/29/2024.    Outpatient Physical Therapy 1 times weekly for 12 weeks to include the following interventions: Manual Therapy, Neuromuscular Re-ed, Patient Education, Therapeutic Activities, and Therapeutic Exercise.     Nam Velazquez PT, DPT, OCS    Co-treated by Diego Elise PT, DPT, OCS, SCS, FAAOMPT    Physician's Signature: _________________________________________ Date: ________________

## 2023-12-11 ENCOUNTER — OFFICE VISIT (OUTPATIENT)
Dept: ORTHOPEDICS | Facility: CLINIC | Age: 23
End: 2023-12-11
Payer: COMMERCIAL

## 2023-12-11 VITALS — BODY MASS INDEX: 27.3 KG/M2 | HEIGHT: 70 IN | WEIGHT: 190.69 LBS

## 2023-12-11 DIAGNOSIS — S43.431A LABRAL TEAR OF SHOULDER, RIGHT, INITIAL ENCOUNTER: ICD-10-CM

## 2023-12-11 DIAGNOSIS — M25.311 MULTIDIRECTIONAL INSTABILITY OF RIGHT GLENOHUMERAL JOINT: Primary | ICD-10-CM

## 2023-12-11 PROCEDURE — 99999 PR PBB SHADOW E&M-EST. PATIENT-LVL III: ICD-10-PCS | Mod: PBBFAC,,, | Performed by: SURGERY

## 2023-12-11 PROCEDURE — 1159F PR MEDICATION LIST DOCUMENTED IN MEDICAL RECORD: ICD-10-PCS | Mod: CPTII,S$GLB,, | Performed by: SURGERY

## 2023-12-11 PROCEDURE — 3008F BODY MASS INDEX DOCD: CPT | Mod: CPTII,S$GLB,, | Performed by: SURGERY

## 2023-12-11 PROCEDURE — 99213 OFFICE O/P EST LOW 20 MIN: CPT | Mod: S$GLB,,, | Performed by: SURGERY

## 2023-12-11 PROCEDURE — 99213 PR OFFICE/OUTPT VISIT, EST, LEVL III, 20-29 MIN: ICD-10-PCS | Mod: S$GLB,,, | Performed by: SURGERY

## 2023-12-11 PROCEDURE — 3008F PR BODY MASS INDEX (BMI) DOCUMENTED: ICD-10-PCS | Mod: CPTII,S$GLB,, | Performed by: SURGERY

## 2023-12-11 PROCEDURE — 1159F MED LIST DOCD IN RCRD: CPT | Mod: CPTII,S$GLB,, | Performed by: SURGERY

## 2023-12-11 PROCEDURE — 99999 PR PBB SHADOW E&M-EST. PATIENT-LVL III: CPT | Mod: PBBFAC,,, | Performed by: SURGERY

## 2023-12-12 NOTE — PROGRESS NOTES
"SUBJECTIVE:    Ms. Anderson is here today for a follow up visit.  She she has a history of pain in the shoulder.  She also has a history of labral tears in the hip which has previously been treated surgically.  She has been having pain particularly the posterior aspect of the shoulder, however does not endorse a lisa dislocation event.  She just started physical therapy having done 1 session.          OBJECTIVE:      Vitals:    12/11/23 1544   Weight: 86.5 kg (190 lb 11.2 oz)   Height: 5' 10" (1.778 m)   PainSc:   7       ORTHOPEDIC EXAM:  Positive sulcus sign, pain on Suri and jerk testing, pain with apprehension positioning negative load and shift.  No obvious rotator cuff pathology on testing within a nonpainful arc.     DIAGNOSTIC STUDIES:  MRI reviewed which demonstrates posterior labral tearing as well as patulous capsule    ASSESSMENT:   1. Multidirectional instability in the setting of the posterior labral tear, right shoulder      PLAN:  There are no diagnoses linked to this encounter.    I discussed with her the treatment paradigm for labral tearing in the setting of multidirectional instability.  As she is not had any lisa dislocation events at this time we will begin her with therapy.  We discussed that should she fail to progress with therapy or start to experience significant dislocation events which can cause bony damage we will discuss other potential nonoperative as well as operative treatments.  Patient is in agreement with this plan.  She can follow up in 3-6 months for evaluation of her progression with therapy.    Jose David Blue MD  [facility]  Orthopedic Surgery      This note was done with voice recognition software. Please excuse any errors missed in proof reading.   "

## 2023-12-14 ENCOUNTER — CLINICAL SUPPORT (OUTPATIENT)
Dept: REHABILITATION | Facility: HOSPITAL | Age: 23
End: 2023-12-14
Payer: COMMERCIAL

## 2023-12-14 DIAGNOSIS — M25.511 CHRONIC RIGHT SHOULDER PAIN: Primary | ICD-10-CM

## 2023-12-14 DIAGNOSIS — G89.29 CHRONIC RIGHT SHOULDER PAIN: Primary | ICD-10-CM

## 2023-12-14 DIAGNOSIS — S43.431A LABRAL TEAR OF SHOULDER, RIGHT, INITIAL ENCOUNTER: ICD-10-CM

## 2023-12-14 PROCEDURE — 97140 MANUAL THERAPY 1/> REGIONS: CPT

## 2023-12-14 PROCEDURE — 97110 THERAPEUTIC EXERCISES: CPT

## 2023-12-14 PROCEDURE — 97112 NEUROMUSCULAR REEDUCATION: CPT

## 2023-12-14 NOTE — PROGRESS NOTES
[facility]   Physical Therapy Treatment Note     Name: Estela Anderson  [facility] Number: [id number]    Therapy Diagnosis:   Encounter Diagnoses   Name Primary?    Chronic right shoulder pain Yes    Labral tear of shoulder, right, initial encounter      Physician: Jose David Blue MD    Visit Date: 12/14/2023    Physician Orders: PT Eval and Treat  Medical Diagnosis from Referral: S43.431A (ICD-10-CM) - Labral tear of shoulder, right, initial encounter  Evaluation Date: 12/7/2023  Authorization Period Expiration: 12/31/2023  Plan of Care Expiration: 3/29/2024  Visit # / Visits authorized: 1/20  FOTO #2:   FOTO: #3:      Precautions: Standard     Time In: 1600  Time Out: 1652  Total Appointment Time (timed & untimed codes): 51 minutes    SUBJECTIVE     Pt reports: her shoulder was feeling pretty good earlier in the week, but hurts a lot today because she had a lot of patients.  She was compliant with home exercise program.  Response to previous treatment: improved symptoms  Functional change: no change    Pain: 5/10  Location: Right Shoulder     OBJECTIVE     Objective Measures updated at progress report unless specified.     Treatment     Estela received the treatments listed below:      Therapeutic Exercises to develop strength and endurance for 14 minutes including:    Thoracic extensions over foam, 15x  Seated shoulder External rotation with yellow band, 2x10  Shoulder bilateral flexion to 90 with yellow band, 10x        Manual Therapy Techniques: Joint mobilizations were applied to the: Right Shoulder for 13 minutes, including:  Prone thoracic manipulation  Joint centration with Internal rotation/ External rotation   Rhythmic stabilizations at 90/90 for Internal rotation / External rotation      Neuromuscular Re-education to improve functional performance for 24 minutes, including:    Prone Shoulder internal rotation - 2 x15  Theraband Walkouts - internal rotation and external rotation;  green therabanarlet 2x10 each  Hitch hiker low trap isometrics, 20x5 seconds     Therapeutic Activities to improve functional performance for 0 minutes, including:          Patient Education and Home Exercises     Home Exercises Provided and Patient Education Provided     Education provided:   - continue with Home exercise program     Written Home Exercises Provided: Patient instructed to cont prior HEP. Exercises were reviewed and Estela was able to demonstrate them prior to the end of the session.  Estela demonstrated good  understanding of the education provided. See EMR under Patient Instructions for exercises provided during therapy sessions    ASSESSMENT     Estela presents with an irritation of her symptoms due to lifting and moving more patients today at work. She demonstrates more painful shoulder range of motion so further rotator cuff activation training and manual joint centration was performed to improve her shoulder stability and positioning. She requires cueing for proper exercise technique for all of her exercises today to ensure proper muscle firing patterns.     Estela Is progressing well towards her goals.   Pt prognosis is Good.     Pt will continue to benefit from skilled outpatient physical therapy to address the deficits listed in the problem list box on initial evaluation, provide pt/family education and to maximize pt's level of independence in the home and community environment.     Pt's spiritual, cultural and educational needs considered and pt agreeable to plan of care and goals.     Anticipated barriers to physical therapy: chronicity of symptoms.     Goals:   Short Term Goals: 3-4 weeks   Patient will have reduced pain complaints from 10/10 to less than or equal to 6/10. -Progressing  Patient will demonstrate increased AROM/PROM by approximately 25% to 50% of initial measurements. -Progressing  Patient will demonstrate increased muscle strength of at least one-half grade as compared to the  initial measurements.  -Progressing     Long Term Goals: 6-8 weeks   Patient will have reduced pain complaints from 6/10 to less than or equal to 2/10. -Progressing  Patient will demonstrate increased AROM/PROM by approximately 75% to 100% of initial measurements. -Progressing  Patient will demonstrate increased muscle strength of at least one grade as compared to the initial measurements. Patient will improve Shoulder FOTO Intake score from 44% to greater than or equal to 71%. -Progressing  Patient will be independent with their home exercise program. -Progressing    PLAN     Progress rotator cuff and leticia-scapular strength, thoracic mobility, posture awareness, and modulate pain.     Dontrell Elise, PT   Board Certified Clinical Specialist in Orthopedic Physical Therapy  Board Certified Clinical Specialist in Sports Physical Therapy  Fellow, [employer] Orth[employer] Manual Physical Therapists

## 2023-12-19 ENCOUNTER — CLINICAL SUPPORT (OUTPATIENT)
Dept: REHABILITATION | Facility: HOSPITAL | Age: 23
End: 2023-12-19
Payer: COMMERCIAL

## 2023-12-19 DIAGNOSIS — G89.29 CHRONIC RIGHT SHOULDER PAIN: Primary | ICD-10-CM

## 2023-12-19 DIAGNOSIS — M25.511 CHRONIC RIGHT SHOULDER PAIN: Primary | ICD-10-CM

## 2023-12-19 DIAGNOSIS — S43.431A LABRAL TEAR OF SHOULDER, RIGHT, INITIAL ENCOUNTER: ICD-10-CM

## 2023-12-19 PROCEDURE — 97140 MANUAL THERAPY 1/> REGIONS: CPT

## 2023-12-19 PROCEDURE — 97110 THERAPEUTIC EXERCISES: CPT

## 2023-12-19 PROCEDURE — 97112 NEUROMUSCULAR REEDUCATION: CPT

## 2023-12-19 NOTE — PROGRESS NOTES
"[facility]   Physical Therapy Treatment Note     Name: Estela Anderson  [facility] Number: [id number]    Therapy Diagnosis:   Encounter Diagnoses   Name Primary?    Chronic right shoulder pain Yes    Labral tear of shoulder, right, initial encounter      Physician: Jose David Blue MD    Visit Date: 12/19/2023  Physician Orders: PT Eval and Treat  Medical Diagnosis from Referral: S43.431A (ICD-10-CM) - Labral tear of shoulder, right, initial encounter  Evaluation Date: 12/7/2023  Authorization Period Expiration: 12/31/2023  Plan of Care Expiration: 3/29/2024  Progress Note Due: 1/7/2023  Visit # / Visits authorized: 2/20  FOTO #2:   FOTO: #3:   Precautions: Standard  Time In: 4:00 pm  Time Out: 5:00 pm  Total Appointment Time (timed & untimed codes): 60 minutes  SUBJECTIVE   Pt reports: she feels like the shoulder is more stable with the exercises, but she feels that sometimes it will throw.  She was compliant with home exercise program.  Response to previous treatment: improved symptoms  Functional change: no change  Pain: 5/10  Location: Right Shoulder   OBJECTIVE   Shoulder Flexion: 160 degrees; increased pain without acromioclavicular elevation; improved with scapular upward rotation and external rotation  Pectoralis Minor: Greater limitation on the right as compared to the left  Treatment     Estela received the treatments listed below:      Therapeutic Exercises to develop strength and endurance for 14 minutes including:  Thoracic Extensions over Foam, 15x  Seated Shoulder External Rotation with Yellow Band, 2x10  Shoulder Bilateral Flexion to 90 with Yellow Band, 15x (3 pulses at the end)     Manual Therapy Techniques: Joint mobilizations were applied to the: Right Shoulder for 16 minutes, including:  Prone Thoracic Manipulation  Joint Centration with Internal Rotation/ External Rotation   Pectoralis Minor MET for Flexibility, 4x6" hold  Rhythmic Stabilizations at 90/90 for Internal " "rotation / External rotation      Neuromuscular Re-education to improve functional performance for 25 minutes, including:    Prone Shoulder Internal Rotation - 2 x15  Theraband Walkouts - Internal Rotation and External Rotation; Green Theraband 2x12 each (cueing for posterior tipping during resistance)  Prone Hitch Hiker Low Trap Isometrics Holds, PT assistance in holding arm 20x5 seconds  Middle Trapezius, Level 1, 2x12     Therapeutic Activities to improve functional performance for 0 minutes, including:          Patient Education and Home Exercises     Home Exercises Provided and Patient Education Provided     Education provided:   - continue with Home exercise program     Written Home Exercises Provided: Patient instructed to cont prior HEP. Exercises were reviewed and Estela was able to demonstrate them prior to the end of the session.  Estela demonstrated good  understanding of the education provided. 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 Upon facilitation of scapular upward rotation and improved posterior tipping with engagement of lower trapezius, which was facilitated in prone with patient taking over more of her shoulder weight with PT supporting. She was able to raise to 180 degrees with minimal pain upon conclusion of session    Estela Is progressing well towards her goals.   Pt prognosis is Good.     Pt will continue to benefit from skilled outpatient physical therapy to address the deficits listed in the problem list box on initial evaluation, provide pt/family education and to maximize pt's level of independence in the home and community environment.     Pt's spiritual, cultural and educational needs " considered and pt agreeable to plan of care and goals.     Anticipated barriers to physical therapy: chronicity of symptoms.     Goals:   Short Term Goals: 3-4 weeks   Patient will have reduced pain complaints from 10/10 to less than or equal to 6/10. -Progressing  Patient will demonstrate increased AROM/PROM by approximately 25% to 50% of initial measurements. -Progressing  Patient will demonstrate increased muscle strength of at least one-half grade as compared to the initial measurements.  -Progressing     Long Term Goals: 6-8 weeks   Patient will have reduced pain complaints from 6/10 to less than or equal to 2/10. -Progressing  Patient will demonstrate increased AROM/PROM by approximately 75% to 100% of initial measurements. -Progressing  Patient will demonstrate increased muscle strength of at least one grade as compared to the initial measurements. Patient will improve Shoulder FOTO Intake score from 44% to greater than or equal to 71%. -Progressing  Patient will be independent with their home exercise program. -Progressing    PLAN     Progress rotator cuff and leticia-scapular strength, thoracic mobility, posture awareness, and modulate pain.     Leslie Cruz, PT   Board Certified Clinical Specialist in Orthopedic Physical Therapy

## 2023-12-20 NOTE — PROGRESS NOTES
"[facility]   Physical Therapy Treatment Note     Name: Estela Anderson  [facility] Number: [id number]    Therapy Diagnosis:   Encounter Diagnoses   Name Primary?    Chronic right shoulder pain Yes    Labral tear of shoulder, right, initial encounter      Physician: Jose David Blue MD    Visit Date: 12/21/2023  Physician Orders: PT Eval and Treat  Medical Diagnosis from Referral: S43.431A (ICD-10-CM) - Labral tear of shoulder, right, initial encounter  Evaluation Date: 12/7/2023  Authorization Period Expiration: 12/31/2023  Plan of Care Expiration: 3/29/2024  Progress Note Due: 1/7/2023  Visit # / Visits authorized: 3/20  FOTO #2:   FOTO: #3:     Precautions: Standard    Time In: 1601  Time Out: 1654    Total Appointment Time (timed & untimed codes): 50 minutes    SUBJECTIVE   Pt reports: she continues to exacerbation symptoms following work.  She was compliant with home exercise program.  Response to previous treatment: improved symptoms  Functional change: no change  Pain: 5/10  Location: Right Shoulder  OBJECTIVE     Shoulder Flexion: 160 degrees; increased pain without acromioclavicular elevation; improved with scapular upward rotation and external rotation  Pectoralis Minor: Greater limitation on the right as compared to the left    Treatment     Estela received the treatments listed below:      Therapeutic Exercises to develop strength and endurance for 00 minutes including:  Thoracic Extensions over Foam, 15x  Seated Shoulder External Rotation with Yellow Band, 2x10  Shoulder Bilateral Flexion to 90 with Yellow Band, 15x (3 pulses at the end)     Manual Therapy Techniques: Joint mobilizations were applied to the: Right Shoulder for 25 minutes, including:  Seated Mid-Thoracic Manipulation  Joint Centration with Internal Rotation/ External Rotation   Pectoralis Minor MET for Flexibility, 4x6" hold  Rhythmic Stabilizations at 90/90 for Internal rotation / External rotation      Neuromuscular " Re-education to improve functional performance for 25 minutes, including:  Standing Shoulder Internal Rotation on wall - 2 x15  Internal rotation walkouts with cable column at 90 flexion - x5 (ceased due to pain)  Standing, propped internal rotation on edge of table - x20  Lower Trapezius, Level 1, 2x12  Middle Trapezius, Level 1, 2x12  Shoulder Taps - x20    Not Today:  Theraband Walkouts - Internal Rotation and External Rotation; Green Theraband 2x12 each (cueing for posterior tipping during resistance)     Therapeutic Activities to improve functional performance for 0 minutes, including:      Patient Education and Home Exercises     Home Exercises Provided and Patient Education Provided     Education provided:   - continue with Home exercise program     Written Home Exercises Provided: Patient instructed to cont prior HEP. Exercises were reviewed and Estela was able to demonstrate them prior to the end of the session.  Estela demonstrated good  understanding of the education provided. 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 Continued difficulties with shoulder elevation with anterior shoulder pain and pinching around 120 degrees of flexion. Negative neural symptoms today. Ongoing internal rotation training to decreased anterior humeral head shearing. Increased range following manual techniques. Re-education exercises for scapular positioning and subscapularis strength. Will have to address work modifications in the event of ongoing exacerbations before therapy sessions.    Estela Is progressing well towards her goals.   Pt prognosis is Good.     Pt will continue to benefit from skilled outpatient physical therapy to address the deficits listed in the problem list box on initial evaluation, provide pt/family education and to maximize pt's level of  independence in the home and community environment.     Pt's spiritual, cultural and educational needs considered and pt agreeable to plan of care and goals.     Anticipated barriers to physical therapy: chronicity of symptoms.     Goals:   Short Term Goals: 3-4 weeks   Patient will have reduced pain complaints from 10/10 to less than or equal to 6/10. -Progressing  Patient will demonstrate increased AROM/PROM by approximately 25% to 50% of initial measurements. -Progressing  Patient will demonstrate increased muscle strength of at least one-half grade as compared to the initial measurements.  -Progressing     Long Term Goals: 6-8 weeks   Patient will have reduced pain complaints from 6/10 to less than or equal to 2/10. -Progressing  Patient will demonstrate increased AROM/PROM by approximately 75% to 100% of initial measurements. -Progressing  Patient will demonstrate increased muscle strength of at least one grade as compared to the initial measurements. Patient will improve Shoulder FOTO Intake score from 44% to greater than or equal to 71%. -Progressing  Patient will be independent with their home exercise program. -Progressing    PLAN     Progress rotator cuff and leticia-scapular strength, thoracic mobility, posture awareness, and modulate pain.     Nam Velazquez, PT     Co-treated with Dontrell Elise, PT   Board Certified Clinical Specialist in Orthopedic Physical Therapy  Board Certified Clinical Specialist in Sports Physical Therapy  Fellow, [employer] Manual Physical Therapists

## 2023-12-21 ENCOUNTER — CLINICAL SUPPORT (OUTPATIENT)
Dept: REHABILITATION | Facility: HOSPITAL | Age: 23
End: 2023-12-21
Payer: COMMERCIAL

## 2023-12-21 DIAGNOSIS — G89.29 CHRONIC RIGHT SHOULDER PAIN: Primary | ICD-10-CM

## 2023-12-21 DIAGNOSIS — M25.511 CHRONIC RIGHT SHOULDER PAIN: Primary | ICD-10-CM

## 2023-12-21 DIAGNOSIS — S43.431A LABRAL TEAR OF SHOULDER, RIGHT, INITIAL ENCOUNTER: ICD-10-CM

## 2023-12-21 PROCEDURE — 97112 NEUROMUSCULAR REEDUCATION: CPT

## 2023-12-21 PROCEDURE — 97140 MANUAL THERAPY 1/> REGIONS: CPT

## 2024-01-04 ENCOUNTER — CLINICAL SUPPORT (OUTPATIENT)
Dept: REHABILITATION | Facility: HOSPITAL | Age: 24
End: 2024-01-04
Payer: COMMERCIAL

## 2024-01-04 DIAGNOSIS — S43.431A LABRAL TEAR OF SHOULDER, RIGHT, INITIAL ENCOUNTER: ICD-10-CM

## 2024-01-04 DIAGNOSIS — G89.29 CHRONIC RIGHT SHOULDER PAIN: Primary | ICD-10-CM

## 2024-01-04 DIAGNOSIS — M25.511 CHRONIC RIGHT SHOULDER PAIN: Primary | ICD-10-CM

## 2024-01-04 PROCEDURE — 97110 THERAPEUTIC EXERCISES: CPT

## 2024-01-04 PROCEDURE — 97140 MANUAL THERAPY 1/> REGIONS: CPT

## 2024-01-04 PROCEDURE — 97112 NEUROMUSCULAR REEDUCATION: CPT

## 2024-01-04 NOTE — PROGRESS NOTES
"[facility]   Physical Therapy Treatment Note     Name: Estela Anderson  [facility] Number: [id number]    Therapy Diagnosis:   Encounter Diagnoses   Name Primary?    Chronic right shoulder pain Yes    Labral tear of shoulder, right, initial encounter      Physician: Jose David Blue MD  Visit Date: 1/4/2024  Physician Orders: PT Eval and Treat  Medical Diagnosis from Referral: S43.431A (ICD-10-CM) - Labral tear of shoulder, right, initial encounter  Evaluation Date: 12/7/2023  Authorization Period Expiration: 12/31/2023   Plan of Care Expiration: 3/29/2024  Progress Note Due: 1/7/2023  Visit # / Visits authorized: 4/20  FOTO #2:   FOTO: #3:   Precautions: Standard  Time In: 4:03 pm  Time Out: 5:00 pm  Total Appointment Time (timed & untimed codes): 57 minutes  SUBJECTIVE   Pt reports: it's not feeling too bad today, but it's been a slow day. The other days where she has needed to use it more have been much worse. She notes later in the session that she feels like the shoulder "moved out of place" on [place] day. This hurt for the remainder of the day but it is better now.  She was compliant with home exercise program.  Response to previous treatment: improved symptoms  Functional change: no change  Pain: 5/10  Location: Right Shoulder  OBJECTIVE   Shoulder Flexion: 155 degrees; increased pain without acromioclavicular elevation; improved with scapular upward rotation and external rotation, decreased pain with anterior glenohumeral gliding and 180 degrees  Treatment     Estela received the treatments listed below:    Therapeutic Exercises to develop strength and endurance for 4 minutes including:  Thoracic Extensions over Foam, 15x    Manual Therapy Techniques: Joint mobilizations were applied to the: Right Shoulder for 18 minutes, including:  Prone Mid-Thoracic Manipulation  Joint Centration with Internal Rotation/ External Rotation   Pectoralis Minor MET for Flexibility, 4x6" hold  Objective " "measures (see above)    Not today:  Rhythmic Stabilizations at 90/90 for Internal rotation / External rotation      Neuromuscular Re-education to improve functional performance for 35 minutes, including:  Prone I.R (20x3" hold) and E.R (30x3" hold)   Internal Rotation Walkouts with Red band at 0 flexion - 10x2 steps  Lower Trapezius, Level 1, 2x12, unilateral  Middle Trapezius, Level 1, 2x12  Shoulder Wobbles at 90/90, with Yellow Therabar (Flexion and I.R. and E.R), 4x15" each direction  Shoulder Taps Walk-Ups with Yellow Band on Wedge - 3x5    Therapeutic Activities to improve functional performance for 0 minutes, including:    Patient Education and Home Exercises     Home Exercises Provided and Patient Education Provided     Education provided:   - continue with Home exercise program     Written Home Exercises Provided: Patient instructed to cont prior HEP. Exercises were reviewed and Estela was able to demonstrate them prior to the end of the session.  Estela demonstrated good  understanding of the education provided. See EMR under Patient Instructions for exercises provided during therapy sessions    ASSESSMENT   Estela demonstrates increased pain with shoulder flexion this visit as well as abduction. Upon assessment, it is noted that during shoulder flexion, the scapula maintains a position of anterior tipping and internal rotation that upon correction was improved in pain and in range of motion. Abduction demonstrated significant anterior glide and upon posterior glide correction, this improved her pain. Time was most dedicated to activation of scapular external rotation and upward rotators and perfect activation of rotator cuff muscles, primarily subscapularis, and to avoid increased posterior deltoid activation during external rotation. She noted decreased pain in her shoulder after the session.  Estela was encouraged to consider modification of activities by awareness of shoulder positioning, and " encouraged to perform exercises prior to work day to see if this aids in greater pain relief during use at work. She was also asked to make note of any further feelings of subluxation and to notify her treating therapist if these become more frequent.    Estela Is progressing well towards her goals.   Pt prognosis is Good.     Pt will continue to benefit from skilled outpatient physical therapy to address the deficits listed in the problem list box on initial evaluation, provide pt/family education and to maximize pt's level of independence in the home and community environment.     Pt's spiritual, cultural and educational needs considered and pt agreeable to plan of care and goals.     Anticipated barriers to physical therapy: chronicity of symptoms.     Goals:   Short Term Goals: 3-4 weeks   Patient will have reduced pain complaints from 10/10 to less than or equal to 6/10. -Progressing  Patient will demonstrate increased AROM/PROM by approximately 25% to 50% of initial measurements. -Progressing  Patient will demonstrate increased muscle strength of at least one-half grade as compared to the initial measurements.  -Progressing     Long Term Goals: 6-8 weeks   Patient will have reduced pain complaints from 6/10 to less than or equal to 2/10. -Progressing  Patient will demonstrate increased AROM/PROM by approximately 75% to 100% of initial measurements. -Progressing  Patient will demonstrate increased muscle strength of at least one grade as compared to the initial measurements. Patient will improve Shoulder FOTO Intake score from 44% to greater than or equal to 71%. -Progressing  Patient will be independent with their home exercise program. -Progressing    PLAN     Progress rotator cuff and leticia-scapular strength, thoracic mobility, posture awareness, and modulate pain.     Leslie Cruz, PT DPT  Board Certified in Orthopedic Physical Therapy

## 2024-01-09 ENCOUNTER — CLINICAL SUPPORT (OUTPATIENT)
Dept: REHABILITATION | Facility: HOSPITAL | Age: 24
End: 2024-01-09
Payer: COMMERCIAL

## 2024-01-09 DIAGNOSIS — S43.431A LABRAL TEAR OF SHOULDER, RIGHT, INITIAL ENCOUNTER: ICD-10-CM

## 2024-01-09 DIAGNOSIS — M25.511 CHRONIC RIGHT SHOULDER PAIN: Primary | ICD-10-CM

## 2024-01-09 DIAGNOSIS — G89.29 CHRONIC RIGHT SHOULDER PAIN: Primary | ICD-10-CM

## 2024-01-09 PROCEDURE — 97110 THERAPEUTIC EXERCISES: CPT

## 2024-01-09 PROCEDURE — 97112 NEUROMUSCULAR REEDUCATION: CPT

## 2024-01-09 PROCEDURE — 97140 MANUAL THERAPY 1/> REGIONS: CPT

## 2024-01-09 NOTE — PROGRESS NOTES
[facility]   Physical Therapy Treatment and Progress Note     Name: Estela Anderson  [facility] Number: [id number]    Therapy Diagnosis:   Encounter Diagnoses   Name Primary?    Chronic right shoulder pain Yes    Labral tear of shoulder, right, initial encounter        Physician: Jose David Blue MD  Visit Date: 1/9/2024  Physician Orders: PT Eval and Treat  Medical Diagnosis from Referral: S43.431A (ICD-10-CM) - Labral tear of shoulder, right, initial encounter  Evaluation Date: 12/7/2023  Authorization Period Expiration: 12/31/2023   Plan of Care Expiration: 3/29/2024  Progress Note Due: 2/7/2023   Most Recent Progress Note: 1/9/2023  Visit # / Visits authorized: 6/20  FOTO #2:   FOTO: #3:   Precautions: Standard  Time In: 4:03 pm  Time Out: 5:00 pm  Total Appointment Time (timed & untimed codes): 57 minutes  SUBJECTIVE   Pt reports:she feels that the exercises may be helping her through the rest of the day when she does them in the morning, but she will still feel a great deal of pain throughout the day when she is busy. She denies any concommitant elbow pain.  She was compliant with home exercise program.  Response to previous treatment: improved symptoms  Functional change: no change  Pain: 4/10 upon arrival  Location: Right Shoulder  OBJECTIVE   Shoulder Flexion: 155 degrees; increased pain without acromioclavicular elevation; improved with scapular upward rotation and external rotation, decreased pain with anterior glenohumeral gliding and 180 degrees  Shoulder E.R.: 4+/5  Shoulder I.R.: 4+/5  Treatment     Estela received the treatments listed below:    Therapeutic Exercises to develop strength and endurance for 4 minutes including:  Thoracic Extensions over Foam, 20x    Manual Therapy Techniques: Joint mobilizations were applied to the: Right Shoulder for 18 minutes, including:  Prone Mid-Thoracic Manipulation  Joint Centration with Internal Rotation/ External Rotation  "Isometrics  Pectoralis Minor MET for Flexibility, prone 4x6" hold  Objective measures (see above)    Not today:  Rhythmic Stabilizations at 90/90 for Internal rotation / External rotation      Neuromuscular Re-education to improve functional performance for 35 minutes, including:  Prone I.R (20x3" hold) and E.R (30x3" hold)   Internal Rotation Walkouts with Red band at 0 flexion - 10x2 steps  Lower Trapezius, Level 1, 2x12, unilateral  Middle Trapezius, Level 1, 2x12  Shoulder Wobbles at 90/90, with Yellow Therabar (Flexion and I.R. and E.R), 5x15" each direction, supported on foam roller  Shoulder Taps Walk-Ups with Yellow Band on Wedge - 3x5    Therapeutic Activities to improve functional performance for 0 minutes, including:    Patient Education and Home Exercises     Home Exercises Provided and Patient Education Provided     Education provided:   - continue with Home exercise program     Written Home Exercises Provided: Patient instructed to cont prior HEP. Exercises were reviewed and Estela was able to demonstrate them prior to the end of the session.  Estela demonstrated good  understanding of the education provided. See EMR under Patient Instructions for exercises provided during therapy sessions    ASSESSMENT   Estela continues to demonstrate impairments consistent with previous visits, including anterior glide and medial rotation of the humeral head, and increased right scapular anterior tilt with internal rotation that upon correction, improves her pain with lifting. Crepitus can still be heard in the shoulder during lowering and she is encouraged to perform exercises with less repetitions, but with more frequency throughout the day to consistently alter movement patterns as opposed to performing exercises once and after work.    Estela Is progressing well towards her goals.   Pt prognosis is Good.     Pt will continue to benefit from skilled outpatient physical therapy to address the deficits listed in " the problem list box on initial evaluation, provide pt/family education and to maximize pt's level of independence in the home and community environment.     Pt's spiritual, cultural and educational needs considered and pt agreeable to plan of care and goals.     Anticipated barriers to physical therapy: chronicity of symptoms.     Goals:   Short Term Goals: 3-4 weeks   Patient will have reduced pain complaints from 10/10 to less than or equal to 6/10. -Progressing  Patient will demonstrate increased AROM/PROM by approximately 25% to 50% of initial measurements. -Progressing  Patient will demonstrate increased muscle strength of at least one-half grade as compared to the initial measurements.  -Progressing     Long Term Goals: 6-8 weeks   Patient will have reduced pain complaints from 6/10 to less than or equal to 2/10. -Progressing  Patient will demonstrate increased AROM/PROM by approximately 75% to 100% of initial measurements. -Progressing  Patient will demonstrate increased muscle strength of at least one grade as compared to the initial measurements. Patient will improve Shoulder FOTO Intake score from 44% to greater than or equal to 71%. -Progressing  Patient will be independent with their home exercise program. -Progressing    PLAN     Progress rotator cuff and leticia-scapular strength, thoracic mobility, posture awareness, and modulate pain. More objective measurements to be taken next visit.    Leslie Cruz, PT DPT  Board Certified in Orthopedic Physical Therapy

## 2024-01-30 ENCOUNTER — CLINICAL SUPPORT (OUTPATIENT)
Dept: REHABILITATION | Facility: HOSPITAL | Age: 24
End: 2024-01-30
Payer: COMMERCIAL

## 2024-01-30 DIAGNOSIS — G89.29 CHRONIC RIGHT SHOULDER PAIN: Primary | ICD-10-CM

## 2024-01-30 DIAGNOSIS — M25.511 CHRONIC RIGHT SHOULDER PAIN: Primary | ICD-10-CM

## 2024-01-30 DIAGNOSIS — S43.431A LABRAL TEAR OF SHOULDER, RIGHT, INITIAL ENCOUNTER: ICD-10-CM

## 2024-01-30 PROCEDURE — 97110 THERAPEUTIC EXERCISES: CPT

## 2024-01-30 PROCEDURE — 97112 NEUROMUSCULAR REEDUCATION: CPT

## 2024-01-30 PROCEDURE — 97530 THERAPEUTIC ACTIVITIES: CPT

## 2024-01-30 PROCEDURE — 97140 MANUAL THERAPY 1/> REGIONS: CPT

## 2024-01-30 NOTE — PROGRESS NOTES
[facility]   Physical Therapy Treatment Note     Name: Estela Anderosn  [facility] Number: [id number]    Therapy Diagnosis:   Encounter Diagnoses   Name Primary?    Chronic right shoulder pain Yes    Labral tear of shoulder, right, initial encounter      Physician: Jose David Blue MD  Visit Date: 1/30/2024  Physician Orders: PT Eval and Treat  Medical Diagnosis from Referral: S43.431A (ICD-10-CM) - Labral tear of shoulder, right, initial encounter  Evaluation Date: 12/7/2023  Authorization Period Expiration: 12/31/2023   Plan of Care Expiration: 3/29/2024  Progress Note Due: 2/7/2023   Most Recent Progress Note: 1/9/2023  Visit # / Visits authorized: 7/20  FOTO #2:   FOTO: #3:   Precautions: Standard  Time In: 4:03 pm  Time Out: 5:00 pm  Total Appointment Time (timed & untimed codes): 57 minutes  SUBJECTIVE   Pt reports: she has not been able to come to therapy for the last few scheduled sessions as her father was undergoing an organ transplant and she needed to use her free time to assist in his recovery. She notes that the shoulder will continue to hurt when she uses it too much.  She was compliant with home exercise program.  Response to previous treatment: improved symptoms  Functional change: no change  Pain: 4/10 upon arrival  Location: Right Shoulder  OBJECTIVE   Shoulder Flexion: 150 degrees; increased pain without acromioclavicular elevation; improved with scapular upward rotation and external rotation, decreased pain with anterior glenohumeral gliding and 180 degrees  Shoulder E.R.: 4+/5 (in 90 degrees flexion in scapular position)  Shoulder I.R.: 4+/5  (in 90 degrees flexion in scapular position)  Treatment     Estela received the treatments listed below:    Therapeutic Exercises to develop strength and endurance for 4 minutes including:  Thoracic Extensions over Foam, 20x    Manual Therapy Techniques: Joint mobilizations were applied to the: Right Shoulder for 12 minutes,  "including:  Prone Mid-Thoracic Manipulation  Joint Centration with Internal Rotation/ External Rotation Isometrics  Pectoralis Minor MET for Flexibility, sidelying 4x6" hold  Scapular Isometric Holds into Corrected Position, sidelying, 10x10"  Objective measures (see above)  Rhythmic Stabilizations at 90/90 for Internal rotation / External rotation      Neuromuscular Re-education to improve functional performance for 26 minutes, including:  Internal Rotation at 90 degrees of flexion - 2x12 (no abduction)  Lower Trapezius, Level 2, 2x12, unilateral  Middle Trapezius, Level 2, 2x12  Shoulder Wobbles at 0/45/90, with Yellow Therabar (Flexion and I.R. and E.R), 2x30" each   Quadruped Rockback with Serratus Press, 15x5" hold    Therapeutic Activities to improve functional performance for 13 minutes, including:  Carrying 8" Step, with neutral scapular posture, 3 laps of 15 yards, down and back   Carries with 5# KB, maintaining appropriate scapular alignment    Patient Education and Home Exercises     Home Exercises Provided and Patient Education Provided     Education provided:   - continue with Home exercise program     Written Home Exercises Provided: Patient instructed to cont prior HEP. Exercises were reviewed and Estela was able to demonstrate them prior to the end of the session.  Estela demonstrated good  understanding of the education provided. See EMR under Patient Instructions for exercises provided during therapy sessions    ASSESSMENT   Estela demonstrates improved scapular external rotation with continued anterior tipping and anteriorly positioned glenohumeral head this visit. She measures at 150 degrees of shoulder flexion with pain at end-range that consistently improves after manual therapy and cuff activation. She responds to therapy well, albeit carry-over is challenged from session to session, and so after 3 missed appointments. Estela may benefit from a customized physical therapy plan of care  " to address the aforementioned impairments in an effort to improve human function and quality of life, and has appointment scheduled this week with primary PT to discuss care going forward.      Estela Is progressing well towards her goals.   Pt prognosis is Good.     Pt will continue to benefit from skilled outpatient physical therapy to address the deficits listed in the problem list box on initial evaluation, provide pt/family education and to maximize pt's level of independence in the home and community environment.     Pt's spiritual, cultural and educational needs considered and pt agreeable to plan of care and goals.     Anticipated barriers to physical therapy: chronicity of symptoms.     Goals:   Short Term Goals: 3-4 weeks   Patient will have reduced pain complaints from 10/10 to less than or equal to 6/10. - Met  Patient will demonstrate increased AROM/PROM by approximately 25% to 50% of initial measurements. -Progressing  Patient will demonstrate increased muscle strength of at least one-half grade as compared to the initial measurements.  -Met     Long Term Goals: 6-8 weeks   Patient will have reduced pain complaints from 6/10 to less than or equal to 2/10. -Progressing  Patient will demonstrate increased AROM/PROM by approximately 75% to 100% of initial measurements. -Progressing  Patient will demonstrate increased muscle strength of at least one grade as compared to the initial measurements. Patient will improve Shoulder FOTO Intake score from 44% to greater than or equal to 71%. -Progressing  Patient will be independent with their home exercise program. -Progressing    PLAN     Progress rotator cuff and leticia-scapular strength, thoracic mobility, posture awareness, and modulate pain.     Leslie Cruz, PT DPT  Board Certified in Orthopedic Physical Therapy

## 2024-02-01 ENCOUNTER — CLINICAL SUPPORT (OUTPATIENT)
Dept: REHABILITATION | Facility: HOSPITAL | Age: 24
End: 2024-02-01
Payer: COMMERCIAL

## 2024-02-01 DIAGNOSIS — S43.431A LABRAL TEAR OF SHOULDER, RIGHT, INITIAL ENCOUNTER: ICD-10-CM

## 2024-02-01 DIAGNOSIS — G89.29 CHRONIC RIGHT SHOULDER PAIN: Primary | ICD-10-CM

## 2024-02-01 DIAGNOSIS — M25.511 CHRONIC RIGHT SHOULDER PAIN: Primary | ICD-10-CM

## 2024-02-01 PROCEDURE — 97140 MANUAL THERAPY 1/> REGIONS: CPT

## 2024-02-01 PROCEDURE — 97112 NEUROMUSCULAR REEDUCATION: CPT

## 2024-02-01 NOTE — PROGRESS NOTES
"[facility]   Physical Therapy Treatment Note     Name: Estela Anderson  [facility] Number: [id number]    Therapy Diagnosis:   Encounter Diagnoses   Name Primary?    Chronic right shoulder pain Yes    Labral tear of shoulder, right, initial encounter      Physician: Jose David Blue MD  Visit Date: 2/1/2024  Physician Orders: PT Eval and Treat  Medical Diagnosis from Referral: S43.431A (ICD-10-CM) - Labral tear of shoulder, right, initial encounter  Evaluation Date: 12/7/2023  Authorization Period Expiration: 12/31/2024   Plan of Care Expiration: 3/29/2024  Progress Note Due: 2/7/2023   Most Recent Progress Note: 1/9/2023  Visit # / Visits authorized: 8/20  FOTO #2:   FOTO: #3:   Precautions: Standard  Time In: 1601  Time Out: 1658  Total Appointment Time (timed & untimed codes): 57 minutes  SUBJECTIVE   Pt reports: she had a pretty busy day today and minimal shoulder symptoms.   She was compliant with home exercise program.  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  Pain: 4/10 upon arrival  Location: Right Shoulder  OBJECTIVE   Shoulder Flexion: 165 degrees; increased pain without acromioclavicular elevation; improved with scapular upward rotation and external rotation, decreased pain with anterior glenohumeral gliding and 180 degrees  Shoulder E.R.: 4+/5 (in 90 degrees flexion in scapular position)  Shoulder I.R.: 4+/5  (in 90 degrees flexion in scapular position)  Treatment     Estela received the treatments listed below:    Therapeutic Exercises to develop strength and endurance for 00 minutes including:  Thoracic Extensions over Foam, 20x    Manual Therapy Techniques: Joint mobilizations were applied to the: Right Shoulder for 17 minutes, including:  Joint Centration with Internal Rotation/ External Rotation Isometrics  Scapular Isometric Holds into Corrected Position, sidelying, 10x10"  Rhythmic Stabilizations at 90/90 for " "Internal rotation / External rotation  Right SC joint inferior mobilizations    Not Today:  Prone Mid-Thoracic Manipulation  Pectoralis Minor MET for Flexibility, sidelying 4x6" hold     Neuromuscular Re-education to improve functional performance for 40 minutes, including:  Lower Trapezius, Level 2, 2x12  Middle Trapezius, Level 2, 2x12  Prone scap retraction with extension - 2#; x20  BOSU Modified Planks - 1 minute x3  Wall Balls at 90 flexion - green; clockwise / counter clockwise; 30 seconds each x3  Theraband external rotation / internal rotation perturbations at neutral - red theraband; 45 seconds x3 each  Handcuff shoulder flexion oscillations - yellow band; 30 seconds x3    Not Today:  Internal Rotation at 90 degrees of flexion - 2x12 (no abduction)  Shoulder Wobbles at 0/45/90, with Yellow Therabar (Flexion and I.R. and E.R), 2x30" each   Quadruped Rockback with Serratus Press, 15x5" hold    Therapeutic Activities to improve functional performance for 00 minutes, including:  Carrying 8" Step, with neutral scapular posture, 3 laps of 15 yards, down and back   Carries with 5# KB, maintaining appropriate scapular alignment    Patient Education and Home Exercises     Home Exercises Provided and Patient Education Provided     Education provided:   - continue with Home exercise program     Written Home Exercises Provided: Patient instructed to cont prior HEP. Exercises were reviewed and Estela was able to demonstrate them prior to the end of the session.  Estela demonstrated good  understanding of the education provided. See EMR under Patient Instructions for exercises provided during therapy sessions    ASSESSMENT   Estela returns to the clinic with improved shoulder motion and decreased symptoms since her last visit. Ongoing symptoms of pain and stiffness at end range of motions. Resolution of symptoms following today's manual techniques focusing on glenohumeral joint centration. Reinforced positive " changes with shoulder girdle stability and endurance training. Fatigue upon end of session with no exacerbations. She will continue with benefit from skilled physical therapy to improve her shoulder stability.    Estela Is progressing well towards her goals.   Pt prognosis is Good.     Pt will continue to benefit from skilled outpatient physical therapy to address the deficits listed in the problem list box on initial evaluation, provide pt/family education and to maximize pt's level of independence in the home and community environment.     Pt's spiritual, cultural and educational needs considered and pt agreeable to plan of care and goals.     Anticipated barriers to physical therapy: chronicity of symptoms.     Goals:   Short Term Goals: 3-4 weeks   Patient will have reduced pain complaints from 10/10 to less than or equal to 6/10. - Met  Patient will demonstrate increased AROM/PROM by approximately 25% to 50% of initial measurements. -Progressing  Patient will demonstrate increased muscle strength of at least one-half grade as compared to the initial measurements.  -Met     Long Term Goals: 6-8 weeks   Patient will have reduced pain complaints from 6/10 to less than or equal to 2/10. -Progressing  Patient will demonstrate increased AROM/PROM by approximately 75% to 100% of initial measurements. -Progressing  Patient will demonstrate increased muscle strength of at least one grade as compared to the initial measurements. Patient will improve Shoulder FOTO Intake score from 44% to greater than or equal to 71%. -Progressing  Patient will be independent with their home exercise program. -Progressing    PLAN     Progress rotator cuff and leticia-scapular strength, thoracic mobility, posture awareness, and modulate pain.     Nam Velazquez, PT    Co-treated with Dontrell Elise PT  Board Certified Clinical Specialist in Orthopedic Physical Therapy  Board Certified Clinical Specialist in Sports Physical Therapy  Fellow,  [employer] Orth[employer] Manual Physical Therapists

## 2024-02-12 ENCOUNTER — CLINICAL SUPPORT (OUTPATIENT)
Dept: REHABILITATION | Facility: HOSPITAL | Age: 24
End: 2024-02-12
Payer: COMMERCIAL

## 2024-02-12 DIAGNOSIS — M25.511 CHRONIC RIGHT SHOULDER PAIN: Primary | ICD-10-CM

## 2024-02-12 DIAGNOSIS — G89.29 CHRONIC RIGHT SHOULDER PAIN: Primary | ICD-10-CM

## 2024-02-12 DIAGNOSIS — S43.431A LABRAL TEAR OF SHOULDER, RIGHT, INITIAL ENCOUNTER: ICD-10-CM

## 2024-02-12 PROCEDURE — 97140 MANUAL THERAPY 1/> REGIONS: CPT

## 2024-02-12 PROCEDURE — 97112 NEUROMUSCULAR REEDUCATION: CPT

## 2024-02-12 NOTE — PROGRESS NOTES
[facility]   Physical Therapy Treatment and Progress Note     Name: Estela Anderson  [facility] Number: [id number]    Therapy Diagnosis:   Encounter Diagnoses   Name Primary?    Chronic right shoulder pain Yes    Labral tear of shoulder, right, initial encounter      Physician: Jose David Blue MD  Visit Date: 2/12/2024  Physician Orders: PT Eval and Treat  Medical Diagnosis from Referral: S43.431A (ICD-10-CM) - Labral tear of shoulder, right, initial encounter  Evaluation Date: 12/7/2023  Authorization Period Expiration: 12/31/2024   Plan of Care Expiration: 3/29/2024  Progress Note Due: 3/13/2023   Visit # / Visits authorized: 5/12  FOTO #2:   FOTO: #3:     Precautions: Standard    Time In: 1313  Time Out: 1409  Total Appointment Time (timed & untimed codes): 53 minutes  SUBJECTIVE   Pt reports: she has been having difficulty sleeping due to her shoulder pain. She has tried propping herself up with pillows but R shoulder continues to hurt even when lying on L side. Her shoulder feels more irritable today, but didn't do a lot of lifting at work.  She was compliant with home exercise program.  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    Pain: 5/10 upon arrival  Location: Right Shoulder    OBJECTIVE     Shoulder Flexion: 165 degrees; increased pain without acromioclavicular elevation; improved with scapular upward rotation and external rotation, decreased pain with anterior glenohumeral gliding and 180 degrees  Shoulder E.R.: 4/5 (in 90 degrees flexion in scapular position)  Shoulder I.R.: 4/5  (in 90 degrees flexion in scapular position)    SUETT median nerve (+) on R    Treatment     Estela received the treatments listed below:    Therapeutic Exercises to develop strength and endurance for 00 minutes including:    Not today:  Thoracic Extensions over Foam, 20x    Manual Therapy Techniques: Joint mobilizations were applied to the:  "Right Shoulder for 15 minutes, including:  Joint Centration with Internal Rotation/ External Rotation Isometrics  Scapular Isometric Holds into Corrected Position, sidelying, 10x10"  Rhythmic Stabilizations at 90/90 for Internal rotation / External rotation  Right SC joint inferior mobilizations, grade III-IV  Right AC mobilizations, grade III-IV  R first rib manipulation  Thoracic manipulation    Not Today:  Prone Mid-Thoracic Manipulation  Pectoralis Minor MET for Flexibility, sidelying 4x6" hold     Neuromuscular Re-education to improve functional performance for 38 minutes, including:  Lower Trapezius, Level 2, 2x12  Middle Trapezius, Level 2, 2x12  Supine Internal rotation/External rotation at 90 degrees 3#, 2x10  Prone Internal rotation/External rotation at 90 degrees 3#, 2x10  Wall Balls at 90 flexion - green; clockwise / counter clockwise; 30 seconds each x3  Theraband external rotation / internal rotation walkouts at 90 degrees green theraband; 2x12  Handcuff shoulder flexion oscillations 45 degrees and 90 degrees- yellow band; 30 seconds x3  Wall slide with red band, 2x12  Body blade, black 4x30f seconds    Not Today:  Internal Rotation at 90 degrees of flexion - 2x12 (no abduction)  Shoulder Wobbles at 0/45/90, with Yellow Therabar (Flexion and I.R. and E.R), 2x30" each   Quadruped Rockback with Serratus Press, 15x5" hold  BOSU Modified Planks - 1 minute x3    Therapeutic Activities to improve functional performance for 00 minutes, including:    Not today:  Carrying 8" Step, with neutral scapular posture, 3 laps of 15 yards, down and back   Carries with 5# KB, maintaining appropriate scapular alignment    Patient Education and Home Exercises     Home Exercises Provided and Patient Education Provided     Education provided:   - continue with Home exercise program and add 1st rib self mobilizations and median nerve sliders    Written Home Exercises Provided: Patient instructed to cont prior HEP. " Exercises were reviewed and Estela was able to demonstrate them prior to the end of the session.  Estela demonstrated good  understanding of the education provided. See EMR under Patient Instructions for exercises provided during therapy sessions    ASSESSMENT     Estela has been seen for 9 visits for her R shoulder pain over the past couple months. She demonstrates some improvements in her shoulder strength, but remains with pain and limited shoulder range of motion. She demonstrates adverse neural tension in her anterior shoulder so her Home exercise program was updated to include nerve gliding and 1st rib mobilizations to improve her neural mobility which I believe is contributing to her pain when sleeping. She remains a good candidate for physical therapy services to address her impairments and to facilitate her return to her prior level of function.     Estela Is progressing well towards her goals.   Pt prognosis is Good.     Pt will continue to benefit from skilled outpatient physical therapy to address the deficits listed in the problem list box on initial evaluation, provide pt/family education and to maximize pt's level of independence in the home and community environment.     Pt's spiritual, cultural and educational needs considered and pt agreeable to plan of care and goals.     Anticipated barriers to physical therapy: chronicity of symptoms.     Goals:   Short Term Goals: 3-4 weeks   Patient will have reduced pain complaints from 10/10 to less than or equal to 6/10. - Met  Patient will demonstrate increased AROM/PROM by approximately 25% to 50% of initial measurements. -Met  Patient will demonstrate increased muscle strength of at least one-half grade as compared to the initial measurements.  -Met     Long Term Goals: 6-8 weeks   Patient will have reduced pain complaints from 6/10 to less than or equal to 2/10. -Progressing  Patient will demonstrate increased AROM/PROM by approximately 75% to 100% of  initial measurements. -Progressing  Patient will demonstrate increased muscle strength of at least one grade as compared to the initial measurements. Patient will improve Shoulder FOTO Intake score from 44% to greater than or equal to 71%. -Progressing  Patient will be independent with their home exercise program. -Progressing    PLAN     Progress rotator cuff and leticia-scapular strength, thoracic mobility, posture awareness, and modulate pain.     Dontrell Elise, PT   Board Certified Clinical Specialist in Orthopedic Physical Therapy  Board Certified Clinical Specialist in Sports Physical Therapy  Fellow, [employer] Orth[employer] Manual Physical Therapists

## 2024-03-06 ENCOUNTER — OFFICE VISIT (OUTPATIENT)
Dept: OBSTETRICS AND GYNECOLOGY | Facility: CLINIC | Age: 24
End: 2024-03-06
Payer: COMMERCIAL

## 2024-03-06 VITALS
HEART RATE: 135 BPM | BODY MASS INDEX: 29.01 KG/M2 | DIASTOLIC BLOOD PRESSURE: 108 MMHG | HEIGHT: 70 IN | SYSTOLIC BLOOD PRESSURE: 144 MMHG | WEIGHT: 202.63 LBS

## 2024-03-06 DIAGNOSIS — N91.2 ABSENT MENSES: Primary | ICD-10-CM

## 2024-03-06 DIAGNOSIS — Z12.4 SCREENING FOR CERVICAL CANCER: ICD-10-CM

## 2024-03-06 LAB
B-HCG UR QL: POSITIVE
CTP QC/QA: YES

## 2024-03-06 PROCEDURE — 87491 CHLMYD TRACH DNA AMP PROBE: CPT | Performed by: STUDENT IN AN ORGANIZED HEALTH CARE EDUCATION/TRAINING PROGRAM

## 2024-03-06 PROCEDURE — 99999 PR PBB SHADOW E&M-EST. PATIENT-LVL III: CPT | Mod: PBBFAC,,, | Performed by: STUDENT IN AN ORGANIZED HEALTH CARE EDUCATION/TRAINING PROGRAM

## 2024-03-06 PROCEDURE — 99204 OFFICE O/P NEW MOD 45 MIN: CPT | Mod: 25,S$GLB,, | Performed by: STUDENT IN AN ORGANIZED HEALTH CARE EDUCATION/TRAINING PROGRAM

## 2024-03-06 PROCEDURE — 81025 URINE PREGNANCY TEST: CPT | Mod: S$GLB,,, | Performed by: STUDENT IN AN ORGANIZED HEALTH CARE EDUCATION/TRAINING PROGRAM

## 2024-03-06 PROCEDURE — 3077F SYST BP >= 140 MM HG: CPT | Mod: CPTII,S$GLB,, | Performed by: STUDENT IN AN ORGANIZED HEALTH CARE EDUCATION/TRAINING PROGRAM

## 2024-03-06 PROCEDURE — 76817 TRANSVAGINAL US OBSTETRIC: CPT | Mod: S$GLB,,, | Performed by: STUDENT IN AN ORGANIZED HEALTH CARE EDUCATION/TRAINING PROGRAM

## 2024-03-06 PROCEDURE — 3080F DIAST BP >= 90 MM HG: CPT | Mod: CPTII,S$GLB,, | Performed by: STUDENT IN AN ORGANIZED HEALTH CARE EDUCATION/TRAINING PROGRAM

## 2024-03-06 PROCEDURE — 1159F MED LIST DOCD IN RCRD: CPT | Mod: CPTII,S$GLB,, | Performed by: STUDENT IN AN ORGANIZED HEALTH CARE EDUCATION/TRAINING PROGRAM

## 2024-03-06 PROCEDURE — 3008F BODY MASS INDEX DOCD: CPT | Mod: CPTII,S$GLB,, | Performed by: STUDENT IN AN ORGANIZED HEALTH CARE EDUCATION/TRAINING PROGRAM

## 2024-03-06 PROCEDURE — 88175 CYTOPATH C/V AUTO FLUID REDO: CPT | Performed by: STUDENT IN AN ORGANIZED HEALTH CARE EDUCATION/TRAINING PROGRAM

## 2024-03-06 PROCEDURE — 87086 URINE CULTURE/COLONY COUNT: CPT | Performed by: STUDENT IN AN ORGANIZED HEALTH CARE EDUCATION/TRAINING PROGRAM

## 2024-03-06 NOTE — PROGRESS NOTES
CC: Absence of menses    Estela Anderson is a 23 y.o. female  presents with complaint of absence of menstruation.  She denies nausea/vomIting/abdominal pain/bleeding.  UPT is positive.     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      Past Medical History:   Diagnosis Date    Allergy     Anxiety      Past Surgical History:   Procedure Laterality Date    ADENOIDECTOMY      sinus sx      TONSILLECTOMY       Social History     Socioeconomic History    Marital status: Single   Tobacco Use    Smoking status: Never     Passive exposure: Never    Smokeless tobacco: Never   Substance and Sexual Activity    Alcohol use: No    Drug use: No    Sexual activity: Yes     Partners: Male     Birth control/protection: None   Social History Narrative    ALL:Penicillin    PMH:T&A , rare OM, last in , scarlet fever in 04    FH:Mother has a mild generalized anxiety disorder and hypertension; PGM has HTN and bipolar disease, PGGM had late onset diabetes mellitus; PGGF had AMI after 50; MGM has fibromyalgia, is a smoker and has alcoholism; MGF has prostate cancer; MGGF had colon cancer; MGGM  of an unknown cancer    SH:Intact family,  younger sibling, no smokers, one dog    School: A student, swim team                                     Family History   Problem Relation Age of Onset    Cancer Maternal Grandfather         prostate cancer    Meningitis Maternal Grandfather     Bipolar disorder Paternal Grandmother     Hypertension Paternal Grandmother     Mental illness Paternal Grandmother         bipolar    Hypertension Mother     Anxiety disorder Mother     Fibromyalgia Maternal Grandmother     Alcohol abuse Maternal Grandmother      "Diabetes Other         T2DM    Heart disease Other         AMI death >49yo    Cancer Other     Cancer Other         colon cancer     OB History    Para Term  AB Living   1             SAB IAB Ectopic Multiple Live Births                  # Outcome Date GA Lbr Alex/2nd Weight Sex Delivery Anes PTL Lv   1 Current                BP (!) 144/108   Pulse (!) 135   Ht 5' 10" (1.778 m)   Wt 91.9 kg (202 lb 9.6 oz)   LMP 2023 (Exact Date)   BMI 29.07 kg/m²     ROS:  GENERAL: Denies weight gain or weight loss. Feeling well overall.   SKIN: Denies rash or lesions.   HEAD: Denies head injury or headache.   NODES: Denies enlarged lymph nodes.   CHEST: Denies chest pain or shortness of breath.   CARDIOVASCULAR: Denies palpitations or left sided chest pain.   ABDOMEN: No abdominal pain, constipation, diarrhea, nausea, vomiting or rectal bleeding.   URINARY: No frequency, dysuria, hematuria, or burning on urination.  REPRODUCTIVE: See HPI.   HEMATOLOGIC: No easy bruisability or excessive bleeding.   MUSCULOSKELETAL: Denies joint pain or swelling.   NEUROLOGIC: Denies syncope or weakness.   PSYCHIATRIC: Denies depression, anxiety or mood swings.    PE:   APPEARANCE: Well nourished, well developed, in no acute distress.  AFFECT: WNL, alert and oriented x 3.  SKIN: No acne or hirsutism.  NECK: Neck symmetric without masses or thyromegaly.  NODES: No inguinal, cervical, axillary or femoral lymph node enlargement.  CHEST: Good respiratory effort.   ABDOMEN: Soft. No tenderness or masses. No hepatosplenomegaly. No hernias.  PELVIC: Normal external female genitalia without lesions. Normal hair distribution. Adequate perineal body, normal urethral meatus. Vagina moist and well rugated without lesions or discharge. Cervix pink, without lesions, discharge or tenderness. No significant cystocele or rectocele. Bimanual exam shows uterus is 9 weeks, regular, mobile and nontender. Adnexa without masses or " tenderness.  EXTREMITIES: No edema.      ULTRASOUND:   Ultrasound performed in the usual fashion, showing viable farnsworth intrauterine pregnancy, crown-rump length =24    mm with flicker,   consistent with   9w2d    No free fluid in cul-de-sac or adnexal pathology.    ASSESSMENT and PLAN:    ICD-10-CM ICD-9-CM    1. Absent menses  N91.2 626.0 POCT urine pregnancy      HIV 1/2 Ag/Ab (4th Gen)      RPR      Hepatitis B Surface Antigen      Type & Screen - Ob Profile      Rubella Antibody, IgG      Urine culture      C. trachomatis/N. gonorrhoeae by AMP DNA      CBC Auto Differential      Hepatitis C Antibody      VARICELLA ZOSTER ANTIBODY, IGG      Comprehensive Metabolic Panel          Orders Placed This Encounter   Procedures    Urine culture    C. trachomatis/N. gonorrhoeae by AMP DNA     Order Specific Question:   Source:     Answer:   Cervicovaginal    HIV 1/2 Ag/Ab (4th Gen)     Standing Status:   Future     Standing Expiration Date:   3/6/2025     Order Specific Question:   Release to patient     Answer:   Immediate    RPR     Standing Status:   Future     Standing Expiration Date:   3/6/2025     Order Specific Question:   Release to patient     Answer:   Immediate    Hepatitis B Surface Antigen     Standing Status:   Future     Standing Expiration Date:   3/6/2025    Rubella Antibody, IgG     Standing Status:   Future     Standing Expiration Date:   3/6/2025    CBC Auto Differential     Standing Status:   Future     Standing Expiration Date:   3/6/2025    Hepatitis C Antibody     Standing Status:   Future     Standing Expiration Date:   5/5/2025     Order Specific Question:   Release to patient     Answer:   Immediate    VARICELLA ZOSTER ANTIBODY, IGG     Standing Status:   Future     Standing Expiration Date:   6/4/2025    Comprehensive Metabolic Panel     Standing Status:   Future     Standing Expiration Date:   5/5/2025    POCT urine pregnancy    Type & Screen - Ob Profile     Standing Status:   Future      Standing Expiration Date:   4/5/2024         - keep CARLOS MANUEL 10/5/24  - Patient was counseled today on proper weight gain based on the [facility] [employer] [facility]'s recommendations based on her pre-pregnancy weight. Discussed foods to avoid in pregnancy (i.e. sushi, fish that are high in mercury, deli meat, and unpasteurized cheeses). Discussed prenatal vitamin options (i.e. stool softener, DHA). Discussed genetic screening, patient desires, discussed genetic carrier screening, patient desires  - 1T labs ordered  - pap today  - US performed today in clinic   - NIPT next visit  - ASA @ 12 weeks      No follow-ups on file.

## 2024-03-08 ENCOUNTER — LAB VISIT (OUTPATIENT)
Dept: LAB | Facility: HOSPITAL | Age: 24
End: 2024-03-08
Attending: STUDENT IN AN ORGANIZED HEALTH CARE EDUCATION/TRAINING PROGRAM
Payer: COMMERCIAL

## 2024-03-08 ENCOUNTER — PATIENT MESSAGE (OUTPATIENT)
Dept: OBSTETRICS AND GYNECOLOGY | Facility: CLINIC | Age: 24
End: 2024-03-08
Payer: COMMERCIAL

## 2024-03-08 DIAGNOSIS — N91.2 ABSENT MENSES: ICD-10-CM

## 2024-03-08 LAB
BACTERIA UR CULT: NORMAL
BACTERIA UR CULT: NORMAL
BASOPHILS # BLD AUTO: 0.04 K/UL (ref 0–0.2)
BASOPHILS NFR BLD: 0.4 % (ref 0–1.9)
DIFFERENTIAL METHOD BLD: ABNORMAL
EOSINOPHIL # BLD AUTO: 0.1 K/UL (ref 0–0.5)
EOSINOPHIL NFR BLD: 1.2 % (ref 0–8)
ERYTHROCYTE [DISTWIDTH] IN BLOOD BY AUTOMATED COUNT: 13.1 % (ref 11.5–14.5)
HBV SURFACE AG SERPL QL IA: NORMAL
HCT VFR BLD AUTO: 36.9 % (ref 37–48.5)
HCV AB SERPL QL IA: NORMAL
HGB BLD-MCNC: 12.2 G/DL (ref 12–16)
HIV 1+2 AB+HIV1 P24 AG SERPL QL IA: NORMAL
IMM GRANULOCYTES # BLD AUTO: 0.02 K/UL (ref 0–0.04)
IMM GRANULOCYTES NFR BLD AUTO: 0.2 % (ref 0–0.5)
LYMPHOCYTES # BLD AUTO: 1.8 K/UL (ref 1–4.8)
LYMPHOCYTES NFR BLD: 18.8 % (ref 18–48)
MCH RBC QN AUTO: 30.6 PG (ref 27–31)
MCHC RBC AUTO-ENTMCNC: 33.1 G/DL (ref 32–36)
MCV RBC AUTO: 93 FL (ref 82–98)
MONOCYTES # BLD AUTO: 0.6 K/UL (ref 0.3–1)
MONOCYTES NFR BLD: 6.6 % (ref 4–15)
NEUTROPHILS # BLD AUTO: 6.9 K/UL (ref 1.8–7.7)
NEUTROPHILS NFR BLD: 72.8 % (ref 38–73)
NRBC BLD-RTO: 0 /100 WBC
PLATELET # BLD AUTO: 329 K/UL (ref 150–450)
PMV BLD AUTO: 9.7 FL (ref 9.2–12.9)
RBC # BLD AUTO: 3.99 M/UL (ref 4–5.4)
WBC # BLD AUTO: 9.48 K/UL (ref 3.9–12.7)

## 2024-03-08 PROCEDURE — 85025 COMPLETE CBC W/AUTO DIFF WBC: CPT | Performed by: STUDENT IN AN ORGANIZED HEALTH CARE EDUCATION/TRAINING PROGRAM

## 2024-03-08 PROCEDURE — 86762 RUBELLA ANTIBODY: CPT | Performed by: STUDENT IN AN ORGANIZED HEALTH CARE EDUCATION/TRAINING PROGRAM

## 2024-03-08 PROCEDURE — 36415 COLL VENOUS BLD VENIPUNCTURE: CPT | Performed by: STUDENT IN AN ORGANIZED HEALTH CARE EDUCATION/TRAINING PROGRAM

## 2024-03-08 PROCEDURE — 86592 SYPHILIS TEST NON-TREP QUAL: CPT | Performed by: STUDENT IN AN ORGANIZED HEALTH CARE EDUCATION/TRAINING PROGRAM

## 2024-03-08 PROCEDURE — 87340 HEPATITIS B SURFACE AG IA: CPT | Performed by: STUDENT IN AN ORGANIZED HEALTH CARE EDUCATION/TRAINING PROGRAM

## 2024-03-08 PROCEDURE — 87389 HIV-1 AG W/HIV-1&-2 AB AG IA: CPT | Performed by: STUDENT IN AN ORGANIZED HEALTH CARE EDUCATION/TRAINING PROGRAM

## 2024-03-08 PROCEDURE — 86803 HEPATITIS C AB TEST: CPT | Performed by: STUDENT IN AN ORGANIZED HEALTH CARE EDUCATION/TRAINING PROGRAM

## 2024-03-09 LAB
C TRACH DNA SPEC QL NAA+PROBE: NOT DETECTED
N GONORRHOEA DNA SPEC QL NAA+PROBE: NOT DETECTED
RPR SER QL: NORMAL

## 2024-03-11 ENCOUNTER — LAB VISIT (OUTPATIENT)
Dept: LAB | Facility: HOSPITAL | Age: 24
End: 2024-03-11
Attending: STUDENT IN AN ORGANIZED HEALTH CARE EDUCATION/TRAINING PROGRAM
Payer: COMMERCIAL

## 2024-03-11 ENCOUNTER — ROUTINE PRENATAL (OUTPATIENT)
Dept: OBSTETRICS AND GYNECOLOGY | Facility: CLINIC | Age: 24
End: 2024-03-11
Payer: COMMERCIAL

## 2024-03-11 VITALS — BODY MASS INDEX: 29.04 KG/M2 | WEIGHT: 202.38 LBS

## 2024-03-11 DIAGNOSIS — Z3A.10 10 WEEKS GESTATION OF PREGNANCY: Primary | ICD-10-CM

## 2024-03-11 DIAGNOSIS — N89.8 VAGINAL DISCHARGE: ICD-10-CM

## 2024-03-11 DIAGNOSIS — N91.2 ABSENT MENSES: ICD-10-CM

## 2024-03-11 LAB
ALBUMIN SERPL BCP-MCNC: 3.7 G/DL (ref 3.5–5.2)
ALP SERPL-CCNC: 57 U/L (ref 55–135)
ALT SERPL W/O P-5'-P-CCNC: 9 U/L (ref 10–44)
ANION GAP SERPL CALC-SCNC: 7 MMOL/L (ref 8–16)
AST SERPL-CCNC: 15 U/L (ref 10–40)
BILIRUB SERPL-MCNC: 0.2 MG/DL (ref 0.1–1)
BILIRUBIN, UA POC OHS: NEGATIVE
BLOOD, UA POC OHS: NEGATIVE
BUN SERPL-MCNC: 9 MG/DL (ref 6–20)
CALCIUM SERPL-MCNC: 9.7 MG/DL (ref 8.7–10.5)
CHLORIDE SERPL-SCNC: 102 MMOL/L (ref 95–110)
CLARITY, UA POC OHS: CLEAR
CO2 SERPL-SCNC: 24 MMOL/L (ref 23–29)
COLOR, UA POC OHS: YELLOW
CREAT SERPL-MCNC: 0.7 MG/DL (ref 0.5–1.4)
EST. GFR  (NO RACE VARIABLE): >60 ML/MIN/1.73 M^2
GLUCOSE SERPL-MCNC: 56 MG/DL (ref 70–110)
GLUCOSE, UA POC OHS: NEGATIVE
KETONES, UA POC OHS: NEGATIVE
LEUKOCYTES, UA POC OHS: NEGATIVE
NITRITE, UA POC OHS: NEGATIVE
PH, UA POC OHS: 5.5
POTASSIUM SERPL-SCNC: 4.2 MMOL/L (ref 3.5–5.1)
PROT SERPL-MCNC: 7.4 G/DL (ref 6–8.4)
PROTEIN, UA POC OHS: NEGATIVE
RUBV IGG SER-ACNC: 147 IU/ML
RUBV IGG SER-IMP: REACTIVE
SODIUM SERPL-SCNC: 133 MMOL/L (ref 136–145)
SPECIFIC GRAVITY, UA POC OHS: 1.01
UROBILINOGEN, UA POC OHS: 0.2

## 2024-03-11 PROCEDURE — 99999 PR PBB SHADOW E&M-EST. PATIENT-LVL III: CPT | Mod: PBBFAC,,, | Performed by: STUDENT IN AN ORGANIZED HEALTH CARE EDUCATION/TRAINING PROGRAM

## 2024-03-11 PROCEDURE — 86901 BLOOD TYPING SEROLOGIC RH(D): CPT | Performed by: STUDENT IN AN ORGANIZED HEALTH CARE EDUCATION/TRAINING PROGRAM

## 2024-03-11 PROCEDURE — 80053 COMPREHEN METABOLIC PANEL: CPT | Performed by: STUDENT IN AN ORGANIZED HEALTH CARE EDUCATION/TRAINING PROGRAM

## 2024-03-11 PROCEDURE — 0500F INITIAL PRENATAL CARE VISIT: CPT | Mod: CPTII,S$GLB,, | Performed by: STUDENT IN AN ORGANIZED HEALTH CARE EDUCATION/TRAINING PROGRAM

## 2024-03-11 PROCEDURE — 36415 COLL VENOUS BLD VENIPUNCTURE: CPT | Mod: PN | Performed by: STUDENT IN AN ORGANIZED HEALTH CARE EDUCATION/TRAINING PROGRAM

## 2024-03-11 PROCEDURE — 81514 NFCT DS BV&VAGINITIS DNA ALG: CPT | Performed by: STUDENT IN AN ORGANIZED HEALTH CARE EDUCATION/TRAINING PROGRAM

## 2024-03-11 PROCEDURE — 86787 VARICELLA-ZOSTER ANTIBODY: CPT | Performed by: STUDENT IN AN ORGANIZED HEALTH CARE EDUCATION/TRAINING PROGRAM

## 2024-03-11 NOTE — PROGRESS NOTES
Complaints today:Here today for vaginal discharge. No odor, no itching     Wt 91.8 kg (202 lb 6.1 oz)   LMP 2023 (Exact Date)   BMI 29.04 kg/m²     23 y.o., at 10w2d by Estimated Date of Delivery: 10/5/24  Patient Active Problem List   Diagnosis    Hypercholesteremia    Chronic right shoulder pain    Labral tear of shoulder, right, initial encounter     OB History    Para Term  AB Living   1             SAB IAB Ectopic Multiple Live Births                  # Outcome Date GA Lbr Alex/2nd Weight Sex Delivery Anes PTL Lv   1 Current                Dating reviewed    Allergies and problem list reviewed and updated    Medical and surgical history reviewed    Prenatal labs reviewed and updated    Physical Exam:  ABD: soft, gravid, nontender,   : cervix normal, no abnormal discharge noted.       Assessment:  Estela was seen today for routine prenatal visit and vaginal discharge.    Diagnoses and all orders for this visit:    10 weeks gestation of pregnancy  -     POCT Urinalysis(Instrument)    Vaginal discharge          Plan:   - affirm collected  - discussed leukorrhea of pregnancy      Elba Bustamante M.D.  Obstetrics and Gynecology

## 2024-03-12 LAB
ABO + RH BLD: NORMAL
BLD GP AB SCN CELLS X3 SERPL QL: NORMAL
VARICELLA INTERPRETATION: POSITIVE
VARICELLA ZOSTER IGG: 291.7 AU/ML

## 2024-03-13 LAB
BACTERIAL VAGINOSIS DNA: NEGATIVE
CANDIDA GLABRATA DNA: NEGATIVE
CANDIDA KRUSEI DNA: NEGATIVE
CANDIDA RRNA VAG QL PROBE: NEGATIVE
T VAGINALIS RRNA GENITAL QL PROBE: NEGATIVE

## 2024-03-15 LAB
FINAL PATHOLOGIC DIAGNOSIS: NORMAL
Lab: NORMAL

## 2024-03-16 ENCOUNTER — PATIENT MESSAGE (OUTPATIENT)
Dept: OBSTETRICS AND GYNECOLOGY | Facility: CLINIC | Age: 24
End: 2024-03-16
Payer: COMMERCIAL

## 2024-03-18 RX ORDER — PRENATAL WITH FERROUS FUM AND FOLIC ACID 3080; 920; 120; 400; 22; 1.84; 3; 20; 10; 1; 12; 200; 27; 25; 2 [IU]/1; [IU]/1; MG/1; [IU]/1; MG/1; MG/1; MG/1; MG/1; MG/1; MG/1; UG/1; MG/1; MG/1; MG/1; MG/1
1 TABLET ORAL DAILY
Qty: 30 TABLET | Refills: 11 | Status: SHIPPED | OUTPATIENT
Start: 2024-03-18 | End: 2025-03-18

## 2024-03-19 ENCOUNTER — PATIENT MESSAGE (OUTPATIENT)
Dept: OBSTETRICS AND GYNECOLOGY | Facility: CLINIC | Age: 24
End: 2024-03-19
Payer: COMMERCIAL

## 2024-03-20 ENCOUNTER — PATIENT MESSAGE (OUTPATIENT)
Dept: OBSTETRICS AND GYNECOLOGY | Facility: CLINIC | Age: 24
End: 2024-03-20
Payer: COMMERCIAL

## 2024-03-20 RX ORDER — ACETAMINOPHEN AND CODEINE PHOSPHATE 300; 30 MG/1; MG/1
1 TABLET ORAL EVERY 4 HOURS PRN
Qty: 20 TABLET | Refills: 0 | Status: SHIPPED | OUTPATIENT
Start: 2024-03-20 | End: 2024-03-30

## 2024-04-04 ENCOUNTER — ROUTINE PRENATAL (OUTPATIENT)
Dept: OBSTETRICS AND GYNECOLOGY | Facility: CLINIC | Age: 24
End: 2024-04-04
Payer: COMMERCIAL

## 2024-04-04 VITALS — SYSTOLIC BLOOD PRESSURE: 106 MMHG | BODY MASS INDEX: 28.91 KG/M2 | WEIGHT: 201.5 LBS | DIASTOLIC BLOOD PRESSURE: 72 MMHG

## 2024-04-04 DIAGNOSIS — Z3A.13 13 WEEKS GESTATION OF PREGNANCY: Primary | ICD-10-CM

## 2024-04-04 DIAGNOSIS — Z13.79 GENETIC TESTING: ICD-10-CM

## 2024-04-04 LAB
BILIRUBIN, UA POC OHS: NEGATIVE
BLOOD, UA POC OHS: NEGATIVE
CLARITY, UA POC OHS: CLEAR
COLOR, UA POC OHS: YELLOW
GLUCOSE, UA POC OHS: NEGATIVE
KETONES, UA POC OHS: NEGATIVE
LEUKOCYTES, UA POC OHS: NEGATIVE
NITRITE, UA POC OHS: NEGATIVE
PH, UA POC OHS: 7
PROTEIN, UA POC OHS: NEGATIVE
SPECIFIC GRAVITY, UA POC OHS: <=1.005
UROBILINOGEN, UA POC OHS: 0.2

## 2024-04-04 PROCEDURE — 99999 PR PBB SHADOW E&M-EST. PATIENT-LVL III: CPT | Mod: PBBFAC,,, | Performed by: STUDENT IN AN ORGANIZED HEALTH CARE EDUCATION/TRAINING PROGRAM

## 2024-04-04 PROCEDURE — 0502F SUBSEQUENT PRENATAL CARE: CPT | Mod: CPTII,S$GLB,, | Performed by: STUDENT IN AN ORGANIZED HEALTH CARE EDUCATION/TRAINING PROGRAM

## 2024-04-04 RX ORDER — ASPIRIN 81 MG/1
81 TABLET ORAL DAILY
COMMUNITY

## 2024-04-04 NOTE — PROGRESS NOTES
Complaints today:None, No Bleeding or pains    /72   Wt 91.4 kg (201 lb 8 oz)   LMP 2023 (Exact Date)   BMI 28.91 kg/m²     23 y.o., at 13w5d by Estimated Date of Delivery: 10/5/24  Patient Active Problem List   Diagnosis    Hypercholesteremia    Chronic right shoulder pain    Labral tear of shoulder, right, initial encounter     OB History    Para Term  AB Living   1             SAB IAB Ectopic Multiple Live Births                  # Outcome Date GA Lbr Alex/2nd Weight Sex Delivery Anes PTL Lv   1 Current                Dating reviewed    Allergies and problem list reviewed and updated    Medical and surgical history reviewed    Prenatal labs reviewed and updated    Physical Exam:  ABD: soft, gravid, nontender,     Assessment:  Estela was seen today for routine prenatal visit.    Diagnoses and all orders for this visit:    13 weeks gestation of pregnancy  -     Connected MOM Enrollment Order    Genetic testing  -     Prenatal Miscellaneous Test, Blood; Future          Plan:   - continue ASA  - NIPT today  - connected MOM enrolled    follow up 4Weeks, bleeding/pain precautions , kick counts, labor precautions given    Elba Bustamante M.D.  Obstetrics and Gynecology

## 2024-04-07 ENCOUNTER — PATIENT MESSAGE (OUTPATIENT)
Dept: OBSTETRICS AND GYNECOLOGY | Facility: CLINIC | Age: 24
End: 2024-04-07
Payer: COMMERCIAL

## 2024-04-08 ENCOUNTER — PATIENT MESSAGE (OUTPATIENT)
Dept: ADMINISTRATIVE | Facility: OTHER | Age: 24
End: 2024-04-08
Payer: COMMERCIAL

## 2024-04-20 ENCOUNTER — PATIENT MESSAGE (OUTPATIENT)
Dept: OTHER | Facility: OTHER | Age: 24
End: 2024-04-20
Payer: COMMERCIAL

## 2024-04-25 ENCOUNTER — PATIENT MESSAGE (OUTPATIENT)
Dept: OBSTETRICS AND GYNECOLOGY | Facility: CLINIC | Age: 24
End: 2024-04-25
Payer: COMMERCIAL

## 2024-04-26 RX ORDER — VALACYCLOVIR HYDROCHLORIDE 500 MG/1
500 TABLET, FILM COATED ORAL 2 TIMES DAILY
Qty: 20 TABLET | Refills: 0 | Status: SHIPPED | OUTPATIENT
Start: 2024-04-26 | End: 2024-05-06

## 2024-04-27 ENCOUNTER — PATIENT MESSAGE (OUTPATIENT)
Dept: OTHER | Facility: OTHER | Age: 24
End: 2024-04-27
Payer: COMMERCIAL

## 2024-05-09 ENCOUNTER — PATIENT MESSAGE (OUTPATIENT)
Dept: OBSTETRICS AND GYNECOLOGY | Facility: CLINIC | Age: 24
End: 2024-05-09

## 2024-05-09 ENCOUNTER — ROUTINE PRENATAL (OUTPATIENT)
Dept: OBSTETRICS AND GYNECOLOGY | Facility: CLINIC | Age: 24
End: 2024-05-09
Payer: COMMERCIAL

## 2024-05-09 VITALS
WEIGHT: 210.13 LBS | SYSTOLIC BLOOD PRESSURE: 128 MMHG | BODY MASS INDEX: 30.15 KG/M2 | DIASTOLIC BLOOD PRESSURE: 84 MMHG

## 2024-05-09 DIAGNOSIS — Z3A.18 18 WEEKS GESTATION OF PREGNANCY: Primary | ICD-10-CM

## 2024-05-09 LAB
BILIRUBIN, UA POC OHS: NEGATIVE
BLOOD, UA POC OHS: NEGATIVE
CLARITY, UA POC OHS: CLEAR
COLOR, UA POC OHS: YELLOW
GLUCOSE, UA POC OHS: NEGATIVE
KETONES, UA POC OHS: NEGATIVE
LEUKOCYTES, UA POC OHS: ABNORMAL
NITRITE, UA POC OHS: NEGATIVE
PH, UA POC OHS: 6.5
PROTEIN, UA POC OHS: NEGATIVE
SPECIFIC GRAVITY, UA POC OHS: 1.02
UROBILINOGEN, UA POC OHS: 0.2

## 2024-05-09 PROCEDURE — 0502F SUBSEQUENT PRENATAL CARE: CPT | Mod: CPTII,S$GLB,, | Performed by: STUDENT IN AN ORGANIZED HEALTH CARE EDUCATION/TRAINING PROGRAM

## 2024-05-09 PROCEDURE — 99999 PR PBB SHADOW E&M-EST. PATIENT-LVL III: CPT | Mod: PBBFAC,,, | Performed by: STUDENT IN AN ORGANIZED HEALTH CARE EDUCATION/TRAINING PROGRAM

## 2024-05-09 NOTE — PROGRESS NOTES
Complaints today:None, Good fetal movements reported,No Bleeding or pains    Got engaged this weekend!!!    /84   Wt 95.3 kg (210 lb 1.6 oz)   LMP 2023 (Exact Date)   BMI 30.15 kg/m²     24 y.o., at 18w5d by Estimated Date of Delivery: 10/5/24  Patient Active Problem List   Diagnosis    Hypercholesteremia    Chronic right shoulder pain    Labral tear of shoulder, right, initial encounter     OB History    Para Term  AB Living   1             SAB IAB Ectopic Multiple Live Births                  # Outcome Date GA Lbr Alex/2nd Weight Sex Type Anes PTL Lv   1 Current                Dating reviewed    Allergies and problem list reviewed and updated    Medical and surgical history reviewed    Prenatal labs reviewed and updated    Physical Exam:  ABD: soft, gravid, nontender,     Assessment:  Estela was seen today for routine prenatal visit.    Diagnoses and all orders for this visit:    18 weeks gestation of pregnancy  -     POCT Urinalysis(Instrument)  -     US OB 14+ Wks, TransAbd, Single Gestation; Future          Plan:   - anatomy ordered  - breast pump/peds info discussed    follow up 4Weeks, bleeding/pain precautions ,. kick counts, labor precautions given    Elba Bustamante M.D.  Obstetrics and Gynecology

## 2024-05-18 ENCOUNTER — PATIENT MESSAGE (OUTPATIENT)
Dept: OTHER | Facility: OTHER | Age: 24
End: 2024-05-18
Payer: COMMERCIAL

## 2024-05-23 ENCOUNTER — HOSPITAL ENCOUNTER (OUTPATIENT)
Dept: RADIOLOGY | Facility: HOSPITAL | Age: 24
Discharge: HOME | End: 2024-05-23
Attending: STUDENT IN AN ORGANIZED HEALTH CARE EDUCATION/TRAINING PROGRAM
Payer: COMMERCIAL

## 2024-05-23 DIAGNOSIS — Z3A.18 18 WEEKS GESTATION OF PREGNANCY: ICD-10-CM

## 2024-05-23 PROCEDURE — 76805 OB US >/= 14 WKS SNGL FETUS: CPT | Mod: TC,PN

## 2024-05-23 PROCEDURE — 76805 OB US >/= 14 WKS SNGL FETUS: CPT | Mod: 26,,, | Performed by: RADIOLOGY

## 2024-05-24 DIAGNOSIS — Z36.2 ENCOUNTER FOR FOLLOW-UP ULTRASOUND OF FETAL ANATOMY: Primary | ICD-10-CM

## 2024-06-11 ENCOUNTER — PATIENT MESSAGE (OUTPATIENT)
Dept: OBSTETRICS AND GYNECOLOGY | Facility: CLINIC | Age: 24
End: 2024-06-11

## 2024-06-11 ENCOUNTER — HOSPITAL ENCOUNTER (OUTPATIENT)
Dept: RADIOLOGY | Facility: HOSPITAL | Age: 24
Discharge: HOME | End: 2024-06-11
Attending: STUDENT IN AN ORGANIZED HEALTH CARE EDUCATION/TRAINING PROGRAM
Payer: COMMERCIAL

## 2024-06-11 ENCOUNTER — ROUTINE PRENATAL (OUTPATIENT)
Dept: OBSTETRICS AND GYNECOLOGY | Facility: CLINIC | Age: 24
End: 2024-06-11
Payer: COMMERCIAL

## 2024-06-11 VITALS
BODY MASS INDEX: 31.16 KG/M2 | WEIGHT: 217.13 LBS | SYSTOLIC BLOOD PRESSURE: 110 MMHG | DIASTOLIC BLOOD PRESSURE: 84 MMHG

## 2024-06-11 DIAGNOSIS — Z3A.23 23 WEEKS GESTATION OF PREGNANCY: Primary | ICD-10-CM

## 2024-06-11 DIAGNOSIS — Z36.2 ENCOUNTER FOR FOLLOW-UP ULTRASOUND OF FETAL ANATOMY: ICD-10-CM

## 2024-06-11 PROCEDURE — 76815 OB US LIMITED FETUS(S): CPT | Mod: 26,,, | Performed by: RADIOLOGY

## 2024-06-11 PROCEDURE — 99999 PR PBB SHADOW E&M-EST. PATIENT-LVL III: CPT | Mod: PBBFAC,,, | Performed by: STUDENT IN AN ORGANIZED HEALTH CARE EDUCATION/TRAINING PROGRAM

## 2024-06-11 PROCEDURE — 0502F SUBSEQUENT PRENATAL CARE: CPT | Mod: CPTII,S$GLB,, | Performed by: STUDENT IN AN ORGANIZED HEALTH CARE EDUCATION/TRAINING PROGRAM

## 2024-06-11 PROCEDURE — 76815 OB US LIMITED FETUS(S): CPT | Mod: TC,PN

## 2024-06-11 NOTE — PROGRESS NOTES
Complaints today:None, Good fetal movements reported,No Bleeding or pains    /84   Wt 98.5 kg (217 lb 2.5 oz)   LMP 2023 (Exact Date)   BMI 31.16 kg/m²     24 y.o., at 23w3d by Estimated Date of Delivery: 10/5/24  Patient Active Problem List   Diagnosis    Hypercholesteremia    Chronic right shoulder pain    Labral tear of shoulder, right, initial encounter     OB History    Para Term  AB Living   1             SAB IAB Ectopic Multiple Live Births                  # Outcome Date GA Lbr Alex/2nd Weight Sex Type Anes PTL Lv   1 Current                Dating reviewed    Allergies and problem list reviewed and updated    Medical and surgical history reviewed    Prenatal labs reviewed and updated    Physical Exam:  ABD: soft, gravid, nontender,     Assessment:  Estela was seen today for routine prenatal visit.    Diagnoses and all orders for this visit:    23 weeks gestation of pregnancy  -     POCT Urinalysis(Instrument)  -     CBC Auto Differential; Future  -     OB Glucose Screen; Future          Plan:   - GDM screen ordered  - repeat anatomy WNL   follow up 4Weeks, bleeding/pain precautions , kick counts, labor precautions given    Elba Bustamante M.D.  Obstetrics and Gynecology

## 2024-06-14 ENCOUNTER — PATIENT MESSAGE (OUTPATIENT)
Dept: OBSTETRICS AND GYNECOLOGY | Facility: CLINIC | Age: 24
End: 2024-06-14
Payer: COMMERCIAL

## 2024-06-29 ENCOUNTER — PATIENT MESSAGE (OUTPATIENT)
Dept: OTHER | Facility: OTHER | Age: 24
End: 2024-06-29
Payer: COMMERCIAL

## 2024-07-11 ENCOUNTER — LAB VISIT (OUTPATIENT)
Dept: LAB | Facility: HOSPITAL | Age: 24
End: 2024-07-11
Attending: STUDENT IN AN ORGANIZED HEALTH CARE EDUCATION/TRAINING PROGRAM
Payer: COMMERCIAL

## 2024-07-11 ENCOUNTER — ROUTINE PRENATAL (OUTPATIENT)
Dept: OBSTETRICS AND GYNECOLOGY | Facility: CLINIC | Age: 24
End: 2024-07-11
Payer: COMMERCIAL

## 2024-07-11 VITALS — BODY MASS INDEX: 32.2 KG/M2 | SYSTOLIC BLOOD PRESSURE: 132 MMHG | DIASTOLIC BLOOD PRESSURE: 80 MMHG | WEIGHT: 224.44 LBS

## 2024-07-11 DIAGNOSIS — Z3A.27 27 WEEKS GESTATION OF PREGNANCY: Primary | ICD-10-CM

## 2024-07-11 DIAGNOSIS — R10.2 PAIN OF PELVIC GIRDLE: ICD-10-CM

## 2024-07-11 DIAGNOSIS — Z3A.23 23 WEEKS GESTATION OF PREGNANCY: ICD-10-CM

## 2024-07-11 DIAGNOSIS — Z36.89 ENCOUNTER FOR ULTRASOUND TO ASSESS FETAL GROWTH: ICD-10-CM

## 2024-07-11 LAB
BASOPHILS # BLD AUTO: 0.03 K/UL (ref 0–0.2)
BASOPHILS NFR BLD: 0.3 % (ref 0–1.9)
BILIRUBIN, UA POC OHS: NEGATIVE
BLOOD, UA POC OHS: NEGATIVE
CLARITY, UA POC OHS: CLEAR
COLOR, UA POC OHS: YELLOW
DIFFERENTIAL METHOD BLD: ABNORMAL
EOSINOPHIL # BLD AUTO: 0.1 K/UL (ref 0–0.5)
EOSINOPHIL NFR BLD: 0.9 % (ref 0–8)
ERYTHROCYTE [DISTWIDTH] IN BLOOD BY AUTOMATED COUNT: 13.3 % (ref 11.5–14.5)
GLUCOSE SERPL-MCNC: 97 MG/DL (ref 70–140)
GLUCOSE, UA POC OHS: NEGATIVE
HCT VFR BLD AUTO: 36.3 % (ref 37–48.5)
HGB BLD-MCNC: 11.7 G/DL (ref 12–16)
IMM GRANULOCYTES # BLD AUTO: 0.05 K/UL (ref 0–0.04)
IMM GRANULOCYTES NFR BLD AUTO: 0.5 % (ref 0–0.5)
KETONES, UA POC OHS: NEGATIVE
LEUKOCYTES, UA POC OHS: ABNORMAL
LYMPHOCYTES # BLD AUTO: 2.1 K/UL (ref 1–4.8)
LYMPHOCYTES NFR BLD: 19.6 % (ref 18–48)
MCH RBC QN AUTO: 30.7 PG (ref 27–31)
MCHC RBC AUTO-ENTMCNC: 32.2 G/DL (ref 32–36)
MCV RBC AUTO: 95 FL (ref 82–98)
MONOCYTES # BLD AUTO: 0.8 K/UL (ref 0.3–1)
MONOCYTES NFR BLD: 7.1 % (ref 4–15)
NEUTROPHILS # BLD AUTO: 7.7 K/UL (ref 1.8–7.7)
NEUTROPHILS NFR BLD: 71.6 % (ref 38–73)
NITRITE, UA POC OHS: NEGATIVE
NRBC BLD-RTO: 0 /100 WBC
PH, UA POC OHS: 6.5
PLATELET # BLD AUTO: 309 K/UL (ref 150–450)
PMV BLD AUTO: 9.7 FL (ref 9.2–12.9)
PROTEIN, UA POC OHS: NEGATIVE
RBC # BLD AUTO: 3.81 M/UL (ref 4–5.4)
SPECIFIC GRAVITY, UA POC OHS: 1.01
UROBILINOGEN, UA POC OHS: 0.2
WBC # BLD AUTO: 10.74 K/UL (ref 3.9–12.7)

## 2024-07-11 PROCEDURE — 99999 PR PBB SHADOW E&M-EST. PATIENT-LVL III: CPT | Mod: PBBFAC,,, | Performed by: STUDENT IN AN ORGANIZED HEALTH CARE EDUCATION/TRAINING PROGRAM

## 2024-07-11 PROCEDURE — 82950 GLUCOSE TEST: CPT | Performed by: STUDENT IN AN ORGANIZED HEALTH CARE EDUCATION/TRAINING PROGRAM

## 2024-07-11 PROCEDURE — 36415 COLL VENOUS BLD VENIPUNCTURE: CPT | Mod: PN | Performed by: STUDENT IN AN ORGANIZED HEALTH CARE EDUCATION/TRAINING PROGRAM

## 2024-07-11 PROCEDURE — 85025 COMPLETE CBC W/AUTO DIFF WBC: CPT | Performed by: STUDENT IN AN ORGANIZED HEALTH CARE EDUCATION/TRAINING PROGRAM

## 2024-07-11 NOTE — PROGRESS NOTES
Complaints today:pelvic girdle pain, Good fetal movements reported,No Bleeding or pains    /80   Wt 101.8 kg (224 lb 6.9 oz)   LMP 2023 (Exact Date)   BMI 32.20 kg/m²     24 y.o., at 27w5d by Estimated Date of Delivery: 10/5/24  Patient Active Problem List   Diagnosis    Hypercholesteremia    Chronic right shoulder pain    Labral tear of shoulder, right, initial encounter     OB History    Para Term  AB Living   1             SAB IAB Ectopic Multiple Live Births                  # Outcome Date GA Lbr Alex/2nd Weight Sex Type Anes PTL Lv   1 Current                Dating reviewed    Allergies and problem list reviewed and updated    Medical and surgical history reviewed    Prenatal labs reviewed and updated    Physical Exam:  ABD: soft, gravid, nontender,     Assessment:  Estela was seen today for routine prenatal visit.    Diagnoses and all orders for this visit:    27 weeks gestation of pregnancy  -     POCT Urinalysis(Instrument)  -     Ambulatory referral/consult to Physical/Occupational Therapy; Future    Encounter for ultrasound to assess fetal growth  -     US OB 14+ Wks, TransAbd, Single Gestation; Future    Pain of pelvic girdle  -     Ambulatory referral/consult to Physical/Occupational Therapy; Future          Plan:   - GDM pending  - TDAP next visit  - discuss RSV vaccine  - referral to PT  - growth ordered    follow up 2Weeks, bleeding/pain precautions , kick counts, labor precautions given    Elba Bustamante M.D.  Obstetrics and Gynecology

## 2024-07-13 ENCOUNTER — PATIENT MESSAGE (OUTPATIENT)
Dept: OTHER | Facility: OTHER | Age: 24
End: 2024-07-13
Payer: COMMERCIAL

## 2024-07-27 ENCOUNTER — PATIENT MESSAGE (OUTPATIENT)
Dept: OTHER | Facility: OTHER | Age: 24
End: 2024-07-27
Payer: COMMERCIAL

## 2024-07-31 ENCOUNTER — PATIENT MESSAGE (OUTPATIENT)
Dept: OBSTETRICS AND GYNECOLOGY | Facility: CLINIC | Age: 24
End: 2024-07-31
Payer: COMMERCIAL

## 2024-07-31 RX ORDER — NIRMATRELVIR AND RITONAVIR 300-100 MG
KIT ORAL
Qty: 30 TABLET | Refills: 0 | Status: SHIPPED | OUTPATIENT
Start: 2024-07-31 | End: 2024-08-05

## 2024-08-10 ENCOUNTER — PATIENT MESSAGE (OUTPATIENT)
Dept: OTHER | Facility: OTHER | Age: 24
End: 2024-08-10
Payer: COMMERCIAL

## 2024-08-15 ENCOUNTER — ROUTINE PRENATAL (OUTPATIENT)
Dept: OBSTETRICS AND GYNECOLOGY | Facility: CLINIC | Age: 24
End: 2024-08-15
Payer: COMMERCIAL

## 2024-08-15 VITALS
WEIGHT: 234.38 LBS | SYSTOLIC BLOOD PRESSURE: 134 MMHG | DIASTOLIC BLOOD PRESSURE: 76 MMHG | BODY MASS INDEX: 33.63 KG/M2

## 2024-08-15 DIAGNOSIS — Z3A.32 32 WEEKS GESTATION OF PREGNANCY: Primary | ICD-10-CM

## 2024-08-15 LAB
BILIRUBIN, UA POC OHS: NEGATIVE
BLOOD, UA POC OHS: NEGATIVE
CLARITY, UA POC OHS: CLEAR
COLOR, UA POC OHS: YELLOW
GLUCOSE, UA POC OHS: NEGATIVE
KETONES, UA POC OHS: NEGATIVE
LEUKOCYTES, UA POC OHS: ABNORMAL
NITRITE, UA POC OHS: NEGATIVE
PH, UA POC OHS: 7
PROTEIN, UA POC OHS: NEGATIVE
SPECIFIC GRAVITY, UA POC OHS: 1.02
UROBILINOGEN, UA POC OHS: 1

## 2024-08-15 PROCEDURE — 99999 PR PBB SHADOW E&M-EST. PATIENT-LVL III: CPT | Mod: PBBFAC,,, | Performed by: STUDENT IN AN ORGANIZED HEALTH CARE EDUCATION/TRAINING PROGRAM

## 2024-08-15 NOTE — PROGRESS NOTES
Complaints today:None, Good fetal movements reported, No Bleeding or pains    /76   Wt 106.3 kg (234 lb 5.6 oz)   LMP 2023 (Exact Date)   BMI 33.63 kg/m²     24 y.o., at 32w5d by Estimated Date of Delivery: 10/5/24  Patient Active Problem List   Diagnosis    Hypercholesteremia    Chronic right shoulder pain    Labral tear of shoulder, right, initial encounter     OB History    Para Term  AB Living   1             SAB IAB Ectopic Multiple Live Births                  # Outcome Date GA Lbr Alex/2nd Weight Sex Type Anes PTL Lv   1 Current                Dating reviewed    Allergies and problem list reviewed and updated    Medical and surgical history reviewed    Prenatal labs reviewed and updated    Physical Exam:  ABD: soft, gravid, nontender,     Assessment:  Estela was seen today for routine prenatal visit.    Diagnoses and all orders for this visit:    32 weeks gestation of pregnancy  -     POCT Urinalysis(Instrument)          Plan:   - TDAP today    follow up 2Weeks, bleeding/pain precautions  kick counts, labor precautions given    Elba Bustamante M.D.  Obstetrics and Gynecology

## 2024-08-19 PROBLEM — M54.50 ACUTE MIDLINE LOW BACK PAIN: Status: ACTIVE | Noted: 2024-08-19

## 2024-08-19 PROBLEM — R10.2 PELVIC PRESSURE IN PREGNANCY, ANTEPARTUM, THIRD TRIMESTER: Status: ACTIVE | Noted: 2024-08-19

## 2024-08-19 PROBLEM — O26.893 PELVIC PRESSURE IN PREGNANCY, ANTEPARTUM, THIRD TRIMESTER: Status: ACTIVE | Noted: 2024-08-19

## 2024-08-19 PROBLEM — Z3A.33 33 WEEKS GESTATION OF PREGNANCY: Status: ACTIVE | Noted: 2024-08-19

## 2024-08-20 ENCOUNTER — ROUTINE PRENATAL (OUTPATIENT)
Dept: OBSTETRICS AND GYNECOLOGY | Facility: CLINIC | Age: 24
End: 2024-08-20
Payer: COMMERCIAL

## 2024-08-20 VITALS
BODY MASS INDEX: 33.53 KG/M2 | WEIGHT: 233.69 LBS | DIASTOLIC BLOOD PRESSURE: 66 MMHG | SYSTOLIC BLOOD PRESSURE: 124 MMHG

## 2024-08-20 DIAGNOSIS — O47.03 THREATENED PREMATURE LABOR IN THIRD TRIMESTER: Primary | ICD-10-CM

## 2024-08-20 PROCEDURE — 99999 PR PBB SHADOW E&M-EST. PATIENT-LVL III: CPT | Mod: PBBFAC,,, | Performed by: STUDENT IN AN ORGANIZED HEALTH CARE EDUCATION/TRAINING PROGRAM

## 2024-08-20 PROCEDURE — 0502F SUBSEQUENT PRENATAL CARE: CPT | Mod: CPTII,S$GLB,, | Performed by: STUDENT IN AN ORGANIZED HEALTH CARE EDUCATION/TRAINING PROGRAM

## 2024-08-20 RX ORDER — OXYCODONE AND ACETAMINOPHEN 10; 325 MG/1; MG/1
1 TABLET ORAL EVERY 4 HOURS PRN
Status: CANCELLED | OUTPATIENT
Start: 2024-08-20

## 2024-08-20 RX ORDER — ONDANSETRON 8 MG/1
8 TABLET, ORALLY DISINTEGRATING ORAL EVERY 8 HOURS PRN
Status: CANCELLED | OUTPATIENT
Start: 2024-08-20

## 2024-08-20 RX ORDER — ACETAMINOPHEN 325 MG/1
650 TABLET ORAL EVERY 6 HOURS PRN
Status: CANCELLED | OUTPATIENT
Start: 2024-08-20

## 2024-08-20 RX ORDER — MUPIROCIN 20 MG/G
OINTMENT TOPICAL 2 TIMES DAILY
Status: CANCELLED | OUTPATIENT
Start: 2024-08-20 | End: 2024-08-25

## 2024-08-20 RX ORDER — PRENATAL WITH FERROUS FUM AND FOLIC ACID 3080; 920; 120; 400; 22; 1.84; 3; 20; 10; 1; 12; 200; 27; 25; 2 [IU]/1; [IU]/1; MG/1; [IU]/1; MG/1; MG/1; MG/1; MG/1; MG/1; MG/1; UG/1; MG/1; MG/1; MG/1; MG/1
1 TABLET ORAL DAILY
Status: CANCELLED | OUTPATIENT
Start: 2024-08-20

## 2024-08-20 RX ORDER — BUTORPHANOL TARTRATE 2 MG/ML
2 INJECTION INTRAMUSCULAR; INTRAVENOUS EVERY 4 HOURS PRN
Status: CANCELLED | OUTPATIENT
Start: 2024-08-20

## 2024-08-20 RX ORDER — SODIUM CHLORIDE 0.9 % (FLUSH) 0.9 %
10 SYRINGE (ML) INJECTION
Status: CANCELLED | OUTPATIENT
Start: 2024-08-20

## 2024-08-20 RX ORDER — DIPHENHYDRAMINE HCL 25 MG
25 CAPSULE ORAL EVERY 4 HOURS PRN
Status: CANCELLED | OUTPATIENT
Start: 2024-08-20

## 2024-08-20 RX ORDER — AMOXICILLIN 250 MG
1 CAPSULE ORAL NIGHTLY PRN
Status: CANCELLED | OUTPATIENT
Start: 2024-08-20

## 2024-08-20 RX ORDER — OXYCODONE AND ACETAMINOPHEN 5; 325 MG/1; MG/1
1 TABLET ORAL EVERY 4 HOURS PRN
Status: CANCELLED | OUTPATIENT
Start: 2024-08-20

## 2024-08-20 RX ORDER — PROMETHAZINE HYDROCHLORIDE 25 MG/1
25 TABLET ORAL EVERY 6 HOURS PRN
Status: CANCELLED | OUTPATIENT
Start: 2024-08-20

## 2024-08-20 RX ORDER — DIPHENHYDRAMINE HYDROCHLORIDE 50 MG/ML
25 INJECTION INTRAMUSCULAR; INTRAVENOUS EVERY 4 HOURS PRN
Status: CANCELLED | OUTPATIENT
Start: 2024-08-20

## 2024-08-20 RX ORDER — SIMETHICONE 80 MG
1 TABLET,CHEWABLE ORAL EVERY 6 HOURS PRN
Status: CANCELLED | OUTPATIENT
Start: 2024-08-20

## 2024-08-20 NOTE — PROGRESS NOTES
Complaints today:Here for worsening pelvic pain and pressure. Went to [facility] yesterday, discharged home, Good fetal movements reported ,no Bleeding     /66   Wt 106 kg (233 lb 11 oz)   LMP 2023 (Exact Date)   BMI 33.53 kg/m²     24 y.o., at 33w3d by Estimated Date of Delivery: 10/5/24  Patient Active Problem List   Diagnosis    Hypercholesteremia    Chronic right shoulder pain    Labral tear of shoulder, right, initial encounter    33 weeks gestation of pregnancy    Pelvic pressure in pregnancy, antepartum, third trimester    Acute midline low back pain    Threatened premature labor in third trimester     OB History    Para Term  AB Living   1             SAB IAB Ectopic Multiple Live Births                  # Outcome Date GA Lbr Alex/2nd Weight Sex Type Anes PTL Lv   1 Current                Dating reviewed    Allergies and problem list reviewed and updated    Medical and surgical history reviewed    Prenatal labs reviewed and updated    Physical Exam:  ABD: soft, gravid, nontender,   SVE: external os 1-2cm/internal os closed/60/-3    Assessment:  Estela was seen today for routine prenatal visit and follow-up.    Diagnoses and all orders for this visit:    Threatened premature labor in third trimester  -     Full code; Standing  -     Vital signs, on admit ; Standing  -     Vital signs; Standing  -     Intake and output; Standing  -     Notify physician; Standing  -     Insert peripheral IV; Standing  -     Place in Observation; Standing  -     Chlorohexidine Gluconate Bath  (3 times per week while admitted); Standing  -     Diet Adult Regular; Standing  -     Place sequential compression device; Standing  -     Group B Streptococcus, PCR; Standing  -     US OB 14+ Wks, TransAbd, Single Gestation; Standing    Other orders  -     sodium chloride 0.9% flush 10 mL  -     IP VTE LOW RISK PATIENT; Standing  -     mupirocin 2 % ointment  -     acetaminophen tablet 650 mg  -     ondansetron  disintegrating tablet 8 mg  -     senna-docusate 8.6-50 mg per tablet 1 tablet  -     simethicone chewable tablet 80 mg  -     diphenhydrAMINE capsule 25 mg  -     diphenhydrAMINE injection 25 mg  -     prenatal vitamin oral tablet  -     oxyCODONE-acetaminophen 5-325 mg per tablet 1 tablet  -     oxyCODONE-acetaminophen  mg per tablet 1 tablet  -     promethazine tablet 25 mg  -     butorphanol injection 2 mg          Plan:   - admit to L&D for monitoring and observation, threatened  labor  - call report given to L&D    Elba Bustamante M.D.  Obstetrics and Gynecology

## 2024-08-20 NOTE — H&P
HISTORY AND PHYSICAL                                                OBSTETRICS          Subjective:       Estela Anderson is a 24 y.o.  female with IUP at 33w3d weeks gestation who c/o pelvic pain and pressure.  This IUP is complicated by nothing.  Patient reports contractions, denies vaginal bleeding, denies LOF.   Fetal Movement: normal.     PMHx:   Past Medical History:   Diagnosis Date    Allergy     Anxiety        PSHx:   Past Surgical History:   Procedure Laterality Date    ADENOIDECTOMY      sinus sx      TONSILLECTOMY         All:   Review of patient's allergies indicates:   Allergen Reactions    Clarithromycin Nausea Only    Penicillin v Hives       Meds: (Not in a hospital admission)      SH:   Social History     Socioeconomic History    Marital status: Single   Tobacco Use    Smoking status: Never     Passive exposure: Never    Smokeless tobacco: Never   Substance and Sexual Activity    Alcohol use: No    Drug use: No    Sexual activity: Yes     Partners: Male     Birth control/protection: None   Social History Narrative    ALL:Penicillin    PMH:T&A , rare OM, last in 07, scarlet fever in 04    FH:Mother has a mild generalized anxiety disorder and hypertension; PGM has HTN and bipolar disease, PGGM had late onset diabetes mellitus; PGGF had AMI after 50; MGM has fibromyalgia, is a smoker and has alcoholism; MGF has prostate cancer; MGGF had colon cancer; MGGM  of an unknown cancer    SH:Intact family,  younger sibling, no smokers, one dog    School: A student, swim team                                     Social Determinants of Health     Physical Activity: Sufficiently Active (2019)    Received from [facility], [facility]    Exercise Vital Sign     Days of Exercise per Week: 6 days     Minutes of Exercise per Session: 120 min   Stress: No Stress Concern Present (2019)    Received from [facility] and [place]  [facility], [facility] and [facility] Occupational Health - Occupational Stress Questionnaire     Feeling of Stress : Not at all       FH:   Family History   Problem Relation Name Age of Onset    Bipolar disorder Paternal Grandmother      Hypertension Paternal Grandmother      Mental illness Paternal Grandmother          bipolar    Fibromyalgia Maternal Grandmother      Alcohol abuse Maternal Grandmother      Cancer Maternal Grandfather          prostate cancer    Meningitis Maternal Grandfather      Hypertension Mother Indy     Anxiety disorder Mother Indy     Diabetes Other PGGM         T2DM    Heart disease Other PGGF         AMI death >51yo    Cancer Other MGGM     Cancer Other MGGF         colon cancer    Uterine cancer Neg Hx      Ovarian cancer Neg Hx      Colon cancer Neg Hx      Breast cancer Neg Hx         OBHx:   OB History    Para Term  AB Living   1 0 0 0 0 0   SAB IAB Ectopic Multiple Live Births   0 0 0 0 0      # Outcome Date GA Lbr Alex/2nd Weight Sex Type Anes PTL Lv   1 Current                Objective:       /66   Wt 106 kg (233 lb 11 oz)   LMP 2023 (Exact Date)   BMI 33.53 kg/m²     Vitals:    24 1126   BP: 124/66   Weight: 106 kg (233 lb 11 oz)       General:   AAOx3, appears stated age   Lungs:   Normal respiratory effort    Heart:   Normal rate   Abdomen:  Gravid, no abdominal tenderness    FHT: 155 Cat 1 (reassuring)                 TOCO: Q 10 minutes   Cervix: External os 1-2cm/internal os closed.      Lab Review  Blood Type A POS  GBBS: unknown  HIV: negative       Assessment:       33w3d weeks gestation.  Here for threatened  labor    There are no hospital problems to display for this patient.         Plan:      Risks, benefits, alternatives and possible complications have been discussed in detail with the patient.   - Consents signed and to chart  - Admit to Labor and Delivery unit  - Monitor  contractions and pain  - procardia PRN for contractions  - if progressing start BMZ series  - growth US on admit  - GBS PCR on admit              Elba Bustamante M.D.  Obstetrics and Gynecology

## 2024-08-21 PROBLEM — O16.3 ELEVATED BLOOD PRESSURE AFFECTING PREGNANCY IN THIRD TRIMESTER, ANTEPARTUM: Status: ACTIVE | Noted: 2024-08-21

## 2024-08-28 ENCOUNTER — OUTPATIENT CASE MANAGEMENT (OUTPATIENT)
Dept: ADMINISTRATIVE | Facility: OTHER | Age: 24
End: 2024-08-28
Payer: COMMERCIAL

## 2024-08-28 PROBLEM — O60.03 PRETERM LABOR IN THIRD TRIMESTER WITHOUT DELIVERY: Status: ACTIVE | Noted: 2024-08-28

## 2024-08-29 ENCOUNTER — HOSPITAL ENCOUNTER (OUTPATIENT)
Dept: RADIOLOGY | Facility: HOSPITAL | Age: 24
Discharge: HOME | End: 2024-08-29
Attending: STUDENT IN AN ORGANIZED HEALTH CARE EDUCATION/TRAINING PROGRAM
Payer: COMMERCIAL

## 2024-08-29 ENCOUNTER — ROUTINE PRENATAL (OUTPATIENT)
Dept: OBSTETRICS AND GYNECOLOGY | Facility: CLINIC | Age: 24
End: 2024-08-29
Payer: COMMERCIAL

## 2024-08-29 VITALS
SYSTOLIC BLOOD PRESSURE: 118 MMHG | WEIGHT: 236.56 LBS | DIASTOLIC BLOOD PRESSURE: 80 MMHG | BODY MASS INDEX: 33.94 KG/M2

## 2024-08-29 DIAGNOSIS — Z36.89 ENCOUNTER FOR ULTRASOUND TO ASSESS FETAL GROWTH: ICD-10-CM

## 2024-08-29 DIAGNOSIS — Z3A.34 34 WEEKS GESTATION OF PREGNANCY: Primary | ICD-10-CM

## 2024-08-29 LAB
BILIRUBIN, UA POC OHS: NEGATIVE
BLOOD, UA POC OHS: NEGATIVE
CLARITY, UA POC OHS: CLEAR
COLOR, UA POC OHS: YELLOW
GLUCOSE, UA POC OHS: NEGATIVE
KETONES, UA POC OHS: ABNORMAL
LEUKOCYTES, UA POC OHS: ABNORMAL
NITRITE, UA POC OHS: NEGATIVE
PH, UA POC OHS: 6.5
PROTEIN, UA POC OHS: ABNORMAL
SPECIFIC GRAVITY, UA POC OHS: 1.02
UROBILINOGEN, UA POC OHS: 0.2

## 2024-08-29 PROCEDURE — 76816 OB US FOLLOW-UP PER FETUS: CPT | Mod: TC,PN

## 2024-08-29 PROCEDURE — 76816 OB US FOLLOW-UP PER FETUS: CPT | Mod: 26,,, | Performed by: RADIOLOGY

## 2024-08-29 PROCEDURE — 99999 PR PBB SHADOW E&M-EST. PATIENT-LVL III: CPT | Mod: PBBFAC,,, | Performed by: STUDENT IN AN ORGANIZED HEALTH CARE EDUCATION/TRAINING PROGRAM

## 2024-08-29 NOTE — PROGRESS NOTES
Complaints today: Here for f/u since discharged home from L&D for prodromal labor. Doing ok, sometimes contractions bad sometimes ok. Went for a walk and over did. Overall she feels unchanged from L&D. No vaginal bleeding. Good movements.     /80   Wt 107.3 kg (236 lb 8.9 oz)   LMP 2023 (Exact Date)   BMI 33.94 kg/m²     24 y.o., at 34w5d by Estimated Date of Delivery: 10/5/24  Patient Active Problem List   Diagnosis    Hypercholesteremia    Chronic right shoulder pain    Labral tear of shoulder, right, initial encounter    33 weeks gestation of pregnancy    Pelvic pressure in pregnancy, antepartum, third trimester    Acute midline low back pain    Threatened premature labor in third trimester    Elevated blood pressure affecting pregnancy in third trimester, antepartum     labor in third trimester without delivery     OB History    Para Term  AB Living   1             SAB IAB Ectopic Multiple Live Births                  # Outcome Date GA Lbr Alex/2nd Weight Sex Type Anes PTL Lv   1 Current                Dating reviewed    Allergies and problem list reviewed and updated    Medical and surgical history reviewed    Prenatal labs reviewed and updated    Physical Exam:  ABD: soft, gravid, nontender,   : deferred     Assessment:  Estela was seen today for routine prenatal visit.    Diagnoses and all orders for this visit:    34 weeks gestation of pregnancy  -     POCT Urinalysis(Instrument)          Plan:   -  labor precautions. She is already at 4cm  - US today reviewed    follow up 1Weeks, bleeding/pain precautions  kick counts, labor precautions given    Elba Bustamante M.D.  Obstetrics and Gynecology

## 2024-08-31 ENCOUNTER — PATIENT MESSAGE (OUTPATIENT)
Dept: OTHER | Facility: OTHER | Age: 24
End: 2024-08-31
Payer: COMMERCIAL

## 2024-09-03 ENCOUNTER — OUTPATIENT CASE MANAGEMENT (OUTPATIENT)
Dept: ADMINISTRATIVE | Facility: OTHER | Age: 24
End: 2024-09-03
Payer: COMMERCIAL

## 2024-09-03 ENCOUNTER — ROUTINE PRENATAL (OUTPATIENT)
Dept: OBSTETRICS AND GYNECOLOGY | Facility: CLINIC | Age: 24
End: 2024-09-03
Payer: COMMERCIAL

## 2024-09-03 VITALS
SYSTOLIC BLOOD PRESSURE: 140 MMHG | DIASTOLIC BLOOD PRESSURE: 82 MMHG | WEIGHT: 235.69 LBS | BODY MASS INDEX: 33.82 KG/M2

## 2024-09-03 DIAGNOSIS — O13.3 GESTATIONAL HYPERTENSION, THIRD TRIMESTER: Primary | ICD-10-CM

## 2024-09-03 LAB
BILIRUBIN, UA POC OHS: NEGATIVE
BLOOD, UA POC OHS: NEGATIVE
CLARITY, UA POC OHS: CLEAR
COLOR, UA POC OHS: YELLOW
CREAT UR-MCNC: 64 MG/DL (ref 15–325)
GLUCOSE, UA POC OHS: NEGATIVE
KETONES, UA POC OHS: NEGATIVE
LEUKOCYTES, UA POC OHS: NEGATIVE
NITRITE, UA POC OHS: NEGATIVE
PH, UA POC OHS: 7
PROT UR-MCNC: <7 MG/DL (ref 0–15)
PROT/CREAT UR: NORMAL MG/G{CREAT} (ref 0–0.2)
PROTEIN, UA POC OHS: NEGATIVE
SPECIFIC GRAVITY, UA POC OHS: 1.01
UROBILINOGEN, UA POC OHS: 0.2

## 2024-09-03 PROCEDURE — 82570 ASSAY OF URINE CREATININE: CPT

## 2024-09-03 PROCEDURE — 99999 PR PBB SHADOW E&M-EST. PATIENT-LVL III: CPT | Mod: PBBFAC,,,

## 2024-09-03 PROCEDURE — 0502F SUBSEQUENT PRENATAL CARE: CPT | Mod: CPTII,S$GLB,,

## 2024-09-03 NOTE — PROGRESS NOTES
The patient presents with complaints of contractions every 3-5 minutes starting last night and into this morning. No bleeding or LOF. Has rx for procardia, not taking. Pregnancy complicated by previous hospital admission for  contractions, 3-4cm/60% on last exam   Reports: Good fetal movements reported    9/3/2024  24 y.o. 35w3d Estimated Date of Delivery: 10/5/24, dating reviewed.   OB History    Para Term  AB Living   1             SAB IAB Ectopic Multiple Live Births                  # Outcome Date GA Lbr Alex/2nd Weight Sex Type Anes PTL Lv   1 Current                Prenatal labs reviewed and updated today    Review of Systems:  General ROS: negative for headache or visual changes  Breast ROS: negative for breast lumps  Gastrointestinal ROS: negative for  constipation, diarrhea or nausea/vomiting  Musculoskeletal ROS: negative for pain in joints or swelling in face or hands.   Neurological ROS: negative for - headaches, numbness/tingling or visual changes      Physical Exam:  BP (!) 140/82   Wt 106.9 kg (235 lb 10.8 oz)   LMP 2023 (Exact Date)   BMI 33.82 kg/m²   Urine Dip: Pending  Fundal Height:deferred   Fetal Heart Tones: 145 bpm  Constitutional: She is oriented to person, place, and time. She appears well-developed and well-nourished. No distress. Overweight   Cardiovascular: Normal rate.    Pulmonary/Chest: Effort normal. No respiratory distress  Abdominal: Soft, gravid, nontender. No rebound and no guarding.     Genitourinary: 3/60/-2, posterior, soft, no bloody show or fluid    Musculoskeletal: Normal range of motion, Minimal peripheral edema.   Neurological: She is alert and oriented to person, place, and time. Coordination normal.   Skin: Skin is warm and dry. She is not diaphoretic.  Psychiatric: She has a normal mood and affect.        Assessment:  24 y.o., at 35w3d Gestation   Patient Active Problem List   Diagnosis    Hypercholesteremia    Chronic right shoulder pain     Labral tear of shoulder, right, initial encounter    33 weeks gestation of pregnancy    Pelvic pressure in pregnancy, antepartum, third trimester    Acute midline low back pain    Threatened premature labor in third trimester    Elevated blood pressure affecting pregnancy in third trimester, antepartum     labor in third trimester without delivery     Current Outpatient Medications on File Prior to Visit   Medication Sig Dispense Refill    aspirin (ECOTRIN) 81 MG EC tablet Take 81 mg by mouth once daily.      prenatal vit 91/iron/folic/dha (PRENATAL + DHA ORAL) Take by mouth.      NIFEdipine (PROCARDIA) 10 MG Cap Take 1 capsule (10 mg total) by mouth every 8 (eight) hours as needed (contractions). 21 capsule 0    ondansetron (ZOFRAN-ODT) 4 MG TbDL Take 1 tablet (4 mg total) by mouth every 8 (eight) hours as needed (n/v). 30 tablet 0    PNV,calcium 72/iron/folic acid (PRENATAL VITAMIN) Tab Take 1 tablet by mouth once daily. 30 tablet 11    valACYclovir (VALTREX) 500 MG tablet Take 1 tablet (500 mg total) by mouth 2 (two) times daily. for 10 days 20 tablet 0     No current facility-administered medications on file prior to visit.         Plan:   Labs today: p:c ratio  Orders today: none  MEds today: none  Procedures Today: none  Follow up 1 Weeks, bleeding/pain precautions, kick counts, labor precautions

## 2024-09-06 ENCOUNTER — ROUTINE PRENATAL (OUTPATIENT)
Dept: OBSTETRICS AND GYNECOLOGY | Facility: CLINIC | Age: 24
End: 2024-09-06
Payer: COMMERCIAL

## 2024-09-06 VITALS
DIASTOLIC BLOOD PRESSURE: 84 MMHG | WEIGHT: 236.75 LBS | SYSTOLIC BLOOD PRESSURE: 130 MMHG | BODY MASS INDEX: 33.97 KG/M2

## 2024-09-06 DIAGNOSIS — Z3A.35 35 WEEKS GESTATION OF PREGNANCY: Primary | ICD-10-CM

## 2024-09-06 DIAGNOSIS — O13.3 GESTATIONAL HYPERTENSION, THIRD TRIMESTER: ICD-10-CM

## 2024-09-06 LAB
BILIRUBIN, UA POC OHS: NEGATIVE
BLOOD, UA POC OHS: NEGATIVE
CLARITY, UA POC OHS: CLEAR
COLOR, UA POC OHS: YELLOW
GLUCOSE, UA POC OHS: NEGATIVE
KETONES, UA POC OHS: NEGATIVE
LEUKOCYTES, UA POC OHS: ABNORMAL
NITRITE, UA POC OHS: NEGATIVE
PH, UA POC OHS: 6.5
PROTEIN, UA POC OHS: NEGATIVE
SPECIFIC GRAVITY, UA POC OHS: 1.01
UROBILINOGEN, UA POC OHS: 0.2

## 2024-09-06 PROCEDURE — 99999 PR PBB SHADOW E&M-EST. PATIENT-LVL III: CPT | Mod: PBBFAC,,, | Performed by: STUDENT IN AN ORGANIZED HEALTH CARE EDUCATION/TRAINING PROGRAM

## 2024-09-06 NOTE — PROGRESS NOTES
Complaints today:on and off contractions, nothing consistent , Good fetal movements reported,No Bleeding or pains    /84   Wt 107.4 kg (236 lb 12.4 oz)   LMP 2023 (Exact Date)   BMI 33.97 kg/m²     24 y.o., at 35w6d by Estimated Date of Delivery: 10/5/24  Patient Active Problem List   Diagnosis    Hypercholesteremia    Chronic right shoulder pain    Labral tear of shoulder, right, initial encounter    33 weeks gestation of pregnancy    Pelvic pressure in pregnancy, antepartum, third trimester    Acute midline low back pain    Threatened premature labor in third trimester    Elevated blood pressure affecting pregnancy in third trimester, antepartum     labor in third trimester without delivery     OB History    Para Term  AB Living   1             SAB IAB Ectopic Multiple Live Births                  # Outcome Date GA Lbr Alex/2nd Weight Sex Type Anes PTL Lv   1 Current                Dating reviewed    Allergies and problem list reviewed and updated    Medical and surgical history reviewed    Prenatal labs reviewed and updated    Physical Exam:  ABD: soft, gravid, nontender,     Assessment:  Estela was seen today for routine prenatal visit.    Diagnoses and all orders for this visit:    35 weeks gestation of pregnancy  -     POCT Urinalysis(Instrument)    Gestational hypertension, third trimester  -     US OB 14+ Wks, TransAbd, Single Gestation; Future          Plan:   - GBS pcr previously negative @ 33 weeks  - 3T labs ordered for next visit  - saw NP earlier this week, mild range BP, normal here  - BPP weekly for GHTN now   - plan IOL for GHTN @ 37 weeks, discussed preE warning signs or future  labor contractions    follow up 1Weeks, bleeding/pain precautions  kick counts, labor precautions given    Elba Bustamante M.D.  Obstetrics and Gynecology

## 2024-09-09 ENCOUNTER — HOSPITAL ENCOUNTER (OUTPATIENT)
Dept: RADIOLOGY | Facility: HOSPITAL | Age: 24
Discharge: HOME | End: 2024-09-09
Attending: STUDENT IN AN ORGANIZED HEALTH CARE EDUCATION/TRAINING PROGRAM
Payer: COMMERCIAL

## 2024-09-09 DIAGNOSIS — O13.3 GESTATIONAL HYPERTENSION, THIRD TRIMESTER: ICD-10-CM

## 2024-09-09 PROCEDURE — 76819 FETAL BIOPHYS PROFIL W/O NST: CPT | Mod: 26,,, | Performed by: RADIOLOGY

## 2024-09-09 PROCEDURE — 76819 FETAL BIOPHYS PROFIL W/O NST: CPT | Mod: TC,PN

## 2024-09-09 PROCEDURE — 76815 OB US LIMITED FETUS(S): CPT | Mod: 26,,, | Performed by: RADIOLOGY

## 2024-09-09 PROCEDURE — 76815 OB US LIMITED FETUS(S): CPT | Mod: TC,PN

## 2024-09-12 ENCOUNTER — ROUTINE PRENATAL (OUTPATIENT)
Dept: OBSTETRICS AND GYNECOLOGY | Facility: CLINIC | Age: 24
End: 2024-09-12
Payer: COMMERCIAL

## 2024-09-12 VITALS
WEIGHT: 241.19 LBS | BODY MASS INDEX: 34.61 KG/M2 | DIASTOLIC BLOOD PRESSURE: 84 MMHG | SYSTOLIC BLOOD PRESSURE: 112 MMHG

## 2024-09-12 DIAGNOSIS — Z3A.36 36 WEEKS GESTATION OF PREGNANCY: Primary | ICD-10-CM

## 2024-09-12 LAB
BILIRUBIN, UA POC OHS: NEGATIVE
BLOOD, UA POC OHS: NEGATIVE
CLARITY, UA POC OHS: CLEAR
COLOR, UA POC OHS: YELLOW
GLUCOSE, UA POC OHS: NEGATIVE
KETONES, UA POC OHS: NEGATIVE
LEUKOCYTES, UA POC OHS: ABNORMAL
NITRITE, UA POC OHS: NEGATIVE
PH, UA POC OHS: 7
PROTEIN, UA POC OHS: NEGATIVE
SPECIFIC GRAVITY, UA POC OHS: 1.02
UROBILINOGEN, UA POC OHS: 0.2

## 2024-09-12 PROCEDURE — 99999 PR PBB SHADOW E&M-EST. PATIENT-LVL III: CPT | Mod: PBBFAC,,, | Performed by: STUDENT IN AN ORGANIZED HEALTH CARE EDUCATION/TRAINING PROGRAM

## 2024-09-12 NOTE — PROGRESS NOTES
Complaints today:none, Good fetal movements reported, No Bleeding or pains    /84   Wt 109.4 kg (241 lb 2.9 oz)   LMP 2023 (Exact Date)   BMI 34.61 kg/m²     24 y.o., at 36w5d by Estimated Date of Delivery: 10/5/24  Patient Active Problem List   Diagnosis    Hypercholesteremia    Chronic right shoulder pain    Labral tear of shoulder, right, initial encounter    33 weeks gestation of pregnancy    Pelvic pressure in pregnancy, antepartum, third trimester    Acute midline low back pain    Threatened premature labor in third trimester    Elevated blood pressure affecting pregnancy in third trimester, antepartum     labor in third trimester without delivery     OB History    Para Term  AB Living   1             SAB IAB Ectopic Multiple Live Births                  # Outcome Date GA Lbr Alex/2nd Weight Sex Type Anes PTL Lv   1 Current                Dating reviewed    Allergies and problem list reviewed and updated    Medical and surgical history reviewed    Prenatal labs reviewed and updated    Physical Exam:  ABD: soft, gravid, nontender,     Assessment:  Estela was seen today for routine prenatal visit.    Diagnoses and all orders for this visit:    36 weeks gestation of pregnancy  -     POCT Urinalysis(Instrument)          Plan:   - BPP 8/8 this week  - IOL next week for GHTN, discussed  - had stopped valtrex, told her to restart   follow up 1Weeks, bleeding/pain precautions  kick counts, labor precautions given    Elba Bustamante M.D.  Obstetrics and Gynecology

## 2024-09-13 ENCOUNTER — PATIENT MESSAGE (OUTPATIENT)
Dept: OBSTETRICS AND GYNECOLOGY | Facility: CLINIC | Age: 24
End: 2024-09-13
Payer: COMMERCIAL

## 2024-09-17 PROBLEM — O13.3 GESTATIONAL HYPERTENSION, THIRD TRIMESTER: Status: ACTIVE | Noted: 2024-09-17

## 2024-09-19 ENCOUNTER — TELEPHONE (OUTPATIENT)
Dept: OBSTETRICS AND GYNECOLOGY | Facility: CLINIC | Age: 24
End: 2024-09-19
Payer: COMMERCIAL

## 2024-09-19 NOTE — TELEPHONE ENCOUNTER
----- Message from Renuka Palmer sent at 9/19/2024 12:48 PM CDT -----  Contact: Self  Type:  Sooner Appointment Request    Caller is requesting a sooner appointment.  Caller declined first available appointment listed below.  Caller will not accept being placed on the waitlist and is requesting a message be sent to doctor.    Name of Caller:  Patient  When is the first available appointment?  10/09  Symptoms:  Needs 2 wk postpartum check up  Would the patient rather a call back or a response via [facility]? Call  Best Call Back Number:  [phone]  Additional Information:  Thank You  
no

## 2024-09-24 ENCOUNTER — PATIENT MESSAGE (OUTPATIENT)
Dept: OBSTETRICS AND GYNECOLOGY | Facility: CLINIC | Age: 24
End: 2024-09-24
Payer: COMMERCIAL

## 2024-10-01 ENCOUNTER — POSTPARTUM VISIT (OUTPATIENT)
Dept: OBSTETRICS AND GYNECOLOGY | Facility: CLINIC | Age: 24
End: 2024-10-01
Payer: COMMERCIAL

## 2024-10-01 VITALS
DIASTOLIC BLOOD PRESSURE: 120 MMHG | HEIGHT: 70 IN | WEIGHT: 229.94 LBS | BODY MASS INDEX: 32.92 KG/M2 | SYSTOLIC BLOOD PRESSURE: 158 MMHG

## 2024-10-01 DIAGNOSIS — F41.9 ANXIETY AND DEPRESSION: ICD-10-CM

## 2024-10-01 DIAGNOSIS — F41.9 ANXIETY: Primary | ICD-10-CM

## 2024-10-01 DIAGNOSIS — F32.A ANXIETY AND DEPRESSION: ICD-10-CM

## 2024-10-01 DIAGNOSIS — O13.9 GESTATIONAL HYPERTENSION, ANTEPARTUM: ICD-10-CM

## 2024-10-01 PROCEDURE — 0503F POSTPARTUM CARE VISIT: CPT | Mod: CPTII,S$GLB,, | Performed by: STUDENT IN AN ORGANIZED HEALTH CARE EDUCATION/TRAINING PROGRAM

## 2024-10-01 PROCEDURE — 99999 PR PBB SHADOW E&M-EST. PATIENT-LVL III: CPT | Mod: PBBFAC,,, | Performed by: STUDENT IN AN ORGANIZED HEALTH CARE EDUCATION/TRAINING PROGRAM

## 2024-10-01 RX ORDER — SERTRALINE HYDROCHLORIDE 50 MG/1
50 TABLET, FILM COATED ORAL DAILY
Qty: 30 TABLET | Refills: 2 | Status: SHIPPED | OUTPATIENT
Start: 2024-10-01 | End: 2025-10-01

## 2024-10-01 RX ORDER — LABETALOL 100 MG/1
100 TABLET, FILM COATED ORAL 2 TIMES DAILY
Qty: 60 TABLET | Refills: 11 | Status: SHIPPED | OUTPATIENT
Start: 2024-10-01 | End: 2025-10-01

## 2024-10-01 NOTE — PROGRESS NOTES
History & Physical  Gynecology      SUBJECTIVE:     Chief Complaint: Postpartum Care       History of Present Illness:    Here today s/p  2 weeks ago. IOL for GHTN, preeclampsia. Doing ok. Baby having weight issues but slowly gaining. Formula and pumping. +++ anxiety, weepy. No SI/HI. Lochia normal. Pain normal.    Denies any headache, vision changes, RUQ pain, CP or SOB.     Has been checking BP at home and normal.   Review of patient's allergies indicates:   Allergen Reactions    Clarithromycin Nausea Only    Penicillin v Hives       Past Medical History:   Diagnosis Date    Allergy     Anxiety      Past Surgical History:   Procedure Laterality Date    ADENOIDECTOMY      HIP SURGERY      sinus sx      TONSILLECTOMY       OB History          1    Para   1    Term   1            AB        Living   1         SAB        IAB        Ectopic        Multiple   0    Live Births   1               Family History   Problem Relation Name Age of Onset    Bipolar disorder Paternal Grandmother      Hypertension Paternal Grandmother      Mental illness Paternal Grandmother          bipolar    Fibromyalgia Maternal Grandmother      Alcohol abuse Maternal Grandmother      Cancer Maternal Grandfather          prostate cancer    Meningitis Maternal Grandfather      Hypertension Mother Indy     Anxiety disorder Mother Indy     Diabetes Other PGGM         T2DM    Heart disease Other PGGF         AMI death >49yo    Cancer Other MGGM     Cancer Other MGGF         colon cancer    Uterine cancer Neg Hx      Ovarian cancer Neg Hx      Colon cancer Neg Hx      Breast cancer Neg Hx       Social History     Tobacco Use    Smoking status: Never     Passive exposure: Never    Smokeless tobacco: Never   Substance Use Topics    Alcohol use: No    Drug use: No       Current Outpatient Medications   Medication Sig    ibuprofen (ADVIL,MOTRIN) 600 MG tablet Take 1 tablet (600 mg total) by mouth every 6 (six) hours as needed for Pain.     PNV,calcium 72/iron/folic acid (PRENATAL VITAMIN) Tab Take 1 tablet by mouth once daily.    labetaloL (NORMODYNE) 100 MG tablet Take 1 tablet (100 mg total) by mouth 2 (two) times daily.    prenatal vit 91/iron/folic/dha (PRENATAL + DHA ORAL) Take by mouth.    sertraline (ZOLOFT) 50 MG tablet Take 1 tablet (50 mg total) by mouth once daily.     No current facility-administered medications for this visit.         Review of Systems:  Review of Systems   Constitutional:  Negative for chills, fatigue and fever.   HENT:  Negative for congestion.    Eyes:  Negative for visual disturbance.   Respiratory:  Negative for cough and shortness of breath.    Cardiovascular:  Negative for chest pain and palpitations.   Gastrointestinal:  Negative for abdominal distention, abdominal pain, constipation, diarrhea, nausea and vomiting.   Genitourinary:  Negative for difficulty urinating, dysuria, hematuria, vaginal bleeding and vaginal discharge.   Skin:  Negative for rash.   Neurological:  Negative for dizziness, seizures, light-headedness and headaches.   Hematological:  Does not bruise/bleed easily.   Psychiatric/Behavioral:  Negative for dysphoric mood. The patient is not nervous/anxious.         OBJECTIVE:     Physical Exam:  Physical Exam  Vitals reviewed.   Constitutional:       General: She is not in acute distress.     Appearance: Normal appearance. She is well-developed.   HENT:      Head: Normocephalic and atraumatic.   Cardiovascular:      Rate and Rhythm: Normal rate and regular rhythm.   Pulmonary:      Effort: Pulmonary effort is normal.   Abdominal:      General: There is no distension.      Palpations: Abdomen is soft.   Genitourinary:     Vagina: Normal.   Skin:     General: Skin is warm.   Neurological:      Mental Status: She is alert and oriented to person, place, and time.   Psychiatric:         Behavior: Behavior normal.         Thought Content: Thought content normal.         Judgment: Judgment normal.            ASSESSMENT:       ICD-10-CM ICD-9-CM    1. Anxiety  F41.9 300.00 sertraline (ZOLOFT) 50 MG tablet      Ambulatory referral/consult to Primary Care Behavioral Health (Non-Opioids)      2. Anxiety and depression  F41.9 300.00 sertraline (ZOLOFT) 50 MG tablet    F32.A 311 Ambulatory referral/consult to Primary Care Behavioral Health (Non-Opioids)          Orders Placed This Encounter   Procedures    Ambulatory referral/consult to Primary Care Behavioral Health (Non-Opioids)     Standing Status:   Future     Standing Expiration Date:   10/1/2025     Referral Priority:   Routine     Referral Type:   Psychiatric     Referral Reason:   Specialty Services Required     Requested Specialty:   Behavioral Health     Number of Visits Requested:   1           Plan:      - start zoloft for pp anxiety  - labetalol started for BP  - referral to therapy  - pumping  - discuss contraception next visit  - pap up to date  - recheck BP in 2 weeks, strict preE preacutions given    Elba Bustamante M.D.  Obstetrics and Gynecology

## 2024-10-02 ENCOUNTER — PATIENT MESSAGE (OUTPATIENT)
Dept: PSYCHIATRY | Facility: CLINIC | Age: 24
End: 2024-10-02
Payer: COMMERCIAL

## 2024-10-25 DIAGNOSIS — F41.9 ANXIETY AND DEPRESSION: ICD-10-CM

## 2024-10-25 DIAGNOSIS — F41.9 ANXIETY: ICD-10-CM

## 2024-10-25 DIAGNOSIS — F32.A ANXIETY AND DEPRESSION: ICD-10-CM

## 2024-10-28 RX ORDER — SERTRALINE HYDROCHLORIDE 50 MG/1
50 TABLET, FILM COATED ORAL
Qty: 90 TABLET | Refills: 1 | Status: SHIPPED | OUTPATIENT
Start: 2024-10-28

## 2024-10-29 ENCOUNTER — POSTPARTUM VISIT (OUTPATIENT)
Dept: OBSTETRICS AND GYNECOLOGY | Facility: CLINIC | Age: 24
End: 2024-10-29
Payer: COMMERCIAL

## 2024-10-29 VITALS
HEIGHT: 70 IN | WEIGHT: 239.19 LBS | DIASTOLIC BLOOD PRESSURE: 82 MMHG | SYSTOLIC BLOOD PRESSURE: 120 MMHG | BODY MASS INDEX: 34.24 KG/M2

## 2024-10-29 DIAGNOSIS — Z30.9 ENCOUNTER FOR CONTRACEPTIVE MANAGEMENT, UNSPECIFIED TYPE: Primary | ICD-10-CM

## 2024-10-29 PROCEDURE — 99999 PR PBB SHADOW E&M-EST. PATIENT-LVL III: CPT | Mod: PBBFAC,,, | Performed by: STUDENT IN AN ORGANIZED HEALTH CARE EDUCATION/TRAINING PROGRAM

## 2024-10-29 PROCEDURE — 0503F POSTPARTUM CARE VISIT: CPT | Mod: CPTII,S$GLB,, | Performed by: STUDENT IN AN ORGANIZED HEALTH CARE EDUCATION/TRAINING PROGRAM

## 2024-10-29 RX ORDER — NORETHINDRONE ACETATE AND ETHINYL ESTRADIOL 1MG-20(24)
1 KIT ORAL DAILY
Qty: 90 TABLET | Refills: 3 | Status: SHIPPED | OUTPATIENT
Start: 2024-10-29 | End: 2025-10-29

## 2024-10-30 ENCOUNTER — OFFICE VISIT (OUTPATIENT)
Dept: INTERNAL MEDICINE | Facility: CLINIC | Age: 24
End: 2024-10-30
Payer: COMMERCIAL

## 2024-10-30 ENCOUNTER — PATIENT MESSAGE (OUTPATIENT)
Dept: INTERNAL MEDICINE | Facility: CLINIC | Age: 24
End: 2024-10-30

## 2024-10-30 DIAGNOSIS — E66.811 OBESITY, CLASS I, BMI 30-34.9: Primary | ICD-10-CM

## 2024-10-30 PROCEDURE — 99499 UNLISTED E&M SERVICE: CPT | Mod: 95,,,

## 2024-10-30 RX ORDER — SEMAGLUTIDE 0.5 MG/.5ML
0.5 INJECTION, SOLUTION SUBCUTANEOUS
Qty: 2 ML | Refills: 0 | Status: SHIPPED | OUTPATIENT
Start: 2024-10-30

## 2024-10-30 RX ORDER — SEMAGLUTIDE 0.25 MG/.5ML
0.25 INJECTION, SOLUTION SUBCUTANEOUS
Qty: 2 ML | Refills: 0 | Status: SHIPPED | OUTPATIENT
Start: 2024-10-30

## 2024-10-30 RX ORDER — SEMAGLUTIDE 1 MG/.5ML
1 INJECTION, SOLUTION SUBCUTANEOUS
Qty: 2 ML | Refills: 0 | Status: SHIPPED | OUTPATIENT
Start: 2024-10-30

## 2024-11-27 ENCOUNTER — PATIENT MESSAGE (OUTPATIENT)
Dept: ADMINISTRATIVE | Facility: OTHER | Age: 24
End: 2024-11-27
Payer: COMMERCIAL

## 2024-12-25 ENCOUNTER — PATIENT MESSAGE (OUTPATIENT)
Dept: ADMINISTRATIVE | Facility: OTHER | Age: 24
End: 2024-12-25
Payer: COMMERCIAL

## 2025-01-07 ENCOUNTER — OFFICE VISIT (OUTPATIENT)
Dept: URGENT CARE | Facility: CLINIC | Age: 25
End: 2025-01-07
Payer: COMMERCIAL

## 2025-01-07 VITALS
DIASTOLIC BLOOD PRESSURE: 87 MMHG | HEART RATE: 83 BPM | WEIGHT: 220 LBS | TEMPERATURE: 98 F | RESPIRATION RATE: 16 BRPM | OXYGEN SATURATION: 99 % | SYSTOLIC BLOOD PRESSURE: 133 MMHG | HEIGHT: 70 IN | BODY MASS INDEX: 31.5 KG/M2

## 2025-01-07 DIAGNOSIS — J06.9 VIRAL URI WITH COUGH: ICD-10-CM

## 2025-01-07 DIAGNOSIS — R09.81 NASAL CONGESTION: Primary | ICD-10-CM

## 2025-01-07 LAB
CTP QC/QA: YES
CTP QC/QA: YES
FLUAV AG NPH QL: NEGATIVE
FLUBV AG NPH QL: NEGATIVE
SARS-COV-2 AG RESP QL IA.RAPID: NEGATIVE

## 2025-01-07 PROCEDURE — 87811 SARS-COV-2 COVID19 W/OPTIC: CPT | Mod: QW,S$GLB,, | Performed by: NURSE PRACTITIONER

## 2025-01-07 PROCEDURE — 87804 INFLUENZA ASSAY W/OPTIC: CPT | Mod: QW,,, | Performed by: NURSE PRACTITIONER

## 2025-01-07 PROCEDURE — 99214 OFFICE O/P EST MOD 30 MIN: CPT | Mod: S$GLB,,, | Performed by: NURSE PRACTITIONER

## 2025-01-07 RX ORDER — BENZONATATE 100 MG/1
100 CAPSULE ORAL 3 TIMES DAILY PRN
Qty: 30 CAPSULE | Refills: 0 | Status: SHIPPED | OUTPATIENT
Start: 2025-01-07 | End: 2025-01-17

## 2025-01-07 NOTE — LETTER
January 7, 2025      [facility]  [address] [id number]       Patient: Estela Anderson   YOB: 2000  Date of Visit: 01/07/2025    To Whom It May Concern:    Princess Anderson  was at [facility] on 01/07/2025. The patient may return to work/school on 01/09/2025  with no restrictions. If you have any questions or concerns, or if I can be of further assistance, please do not hesitate to contact me.    Sincerely,    Sherita Mitchell, NP

## 2025-01-07 NOTE — PROGRESS NOTES
"Subjective:      Patient ID: Estela Anderson is a 24 y.o. female.    Vitals:  height is 5' 10" (1.778 m) and weight is 99.8 kg (220 lb). Her temperature is 97.9 °F (36.6 °C). Her blood pressure is 133/87 and her pulse is 83. Her respiration is 16 and oxygen saturation is 99%.     Chief Complaint: Nasal Congestion    Pt c/o nasal congestion, body aches, runny nose, scratchy throat  x3 days.         Constitution: Positive for fatigue. Negative for appetite change, chills, sweating and fever.   HENT:  Positive for congestion and sore throat (Scratchy). Negative for ear pain (Congestion).    Gastrointestinal:  Negative for vomiting and diarrhea.   Musculoskeletal:  Positive for muscle ache.   Skin:  Negative for rash.      Objective:     Physical Exam   Constitutional: She is oriented to person, place, and time. She is cooperative.  Non-toxic appearance. She does not appear ill. No distress. awake  HENT:   Head: Normocephalic and atraumatic.   Ears:   Right Ear: External ear and ear canal normal. Tympanic membrane is bulging. Tympanic membrane is not erythematous.   Left Ear: External ear and ear canal normal. Tympanic membrane is bulging. Tympanic membrane is not erythematous.   Nose: Rhinorrhea and congestion present.   Mouth/Throat: Mucous membranes are moist. Posterior oropharyngeal erythema present. No oropharyngeal exudate.   Eyes: Conjunctivae are normal. Right eye exhibits no discharge. Left eye exhibits no discharge.   Cardiovascular: Normal rate, regular rhythm and normal heart sounds.   Pulmonary/Chest: Effort normal and breath sounds normal. No accessory muscle usage. No tachypnea. No respiratory distress. She has no wheezes. She has no rhonchi. She has no rales. She exhibits no tenderness.   Abdominal: Normal appearance.   Neurological: no focal deficit. She is alert and oriented to person, place, and time. No sensory deficit.   Skin: Skin is warm, dry, not diaphoretic and no rash. Capillary refill takes 2 " to 3 seconds.   Psychiatric: Her behavior is normal. Mood normal.   Nursing note and vitals reviewed.      Assessment:     1. Nasal congestion    2. Viral URI with cough      COVID negative  Flu a/B negative    Plan:       Nasal congestion  -     SARS Coronavirus 2 Antigen, POCT Manual Read  -     POCT Influenza A/B Rapid Antigen    Viral URI with cough  -     benzonatate (TESSALON) 100 MG capsule; Take 1 capsule (100 mg total) by mouth 3 (three) times daily as needed for Cough.  Dispense: 30 capsule; Refill: 0

## 2025-01-07 NOTE — PATIENT INSTRUCTIONS
Symptomatic treatment to include:    Rest, increase fluid intake to include 50 % water, 50% electrolyte replacement  Ibuprofen/Tylenol as directed for fever, sore throat, headache, body aches.  Tylenol helps with fever but ibuprofen or aleve helps best for other symptoms.   Always take ibuprofen and or Aleve with food as repeated use can cause stomach irritation.  It is also advised to start taking Pepcid 20 mg over-the-counter twice a day for 7-10 days whenever taking NSAIDs for extended times for stomach protection  Zyrtec or antihistamine of choice over-the-counter daily until symptoms have resolved  Flonase or nasal steroid of choice over-the-counter daily until symptoms have resolved  Astepro antihistamine nasal spray over-the-counter daily until symptoms have resolved  Tessalon perles cough pills as needed day or night.  Helps best for dry, throat irritation cough.  Mucinex D purchase behind the pharmacy counter over the counter as directed for sinus congestion. Do not use longer than 4-5 days.   Afrin nasal spray as directed as needed for nasal congestion do not use longer than 3-4 days  Warm, salt water gargles, over the counter throat lozenges or sprays as desires.   Liquid benadryl and maalox 1 to 1 concentration, gargle and spit for temporary relief for sore throat.  ER for difficulty breathing not relieved by rest, excessive lethargy and/or change in mental status  Return to clinic for wheezing, shortness of breath, chest tightness, vomiting for re-evaluation and possible need for additional medications for the symptoms     May return to work/school/public events once symptoms are improving and no fever for 24 hours

## 2025-01-25 DIAGNOSIS — E66.811 OBESITY, CLASS I, BMI 30-34.9: ICD-10-CM

## 2025-01-27 RX ORDER — SEMAGLUTIDE 1 MG/.5ML
1 INJECTION, SOLUTION SUBCUTANEOUS
Qty: 2 ML | Refills: 0 | Status: ACTIVE | OUTPATIENT
Start: 2025-01-27

## 2025-02-04 ENCOUNTER — OFFICE VISIT (OUTPATIENT)
Dept: PAIN MEDICINE | Facility: CLINIC | Age: 25
End: 2025-02-04
Payer: COMMERCIAL

## 2025-02-04 ENCOUNTER — TELEPHONE (OUTPATIENT)
Dept: PAIN MEDICINE | Facility: CLINIC | Age: 25
End: 2025-02-04

## 2025-02-04 VITALS
HEART RATE: 125 BPM | HEIGHT: 70 IN | BODY MASS INDEX: 31.57 KG/M2 | SYSTOLIC BLOOD PRESSURE: 164 MMHG | DIASTOLIC BLOOD PRESSURE: 133 MMHG

## 2025-02-04 DIAGNOSIS — G89.29 CHRONIC RIGHT SHOULDER PAIN: ICD-10-CM

## 2025-02-04 DIAGNOSIS — S43.431A LABRAL TEAR OF SHOULDER, RIGHT, INITIAL ENCOUNTER: Primary | ICD-10-CM

## 2025-02-04 DIAGNOSIS — M25.511 CHRONIC RIGHT SHOULDER PAIN: ICD-10-CM

## 2025-02-04 PROCEDURE — 1159F MED LIST DOCD IN RCRD: CPT | Mod: CPTII,S$GLB,, | Performed by: STUDENT IN AN ORGANIZED HEALTH CARE EDUCATION/TRAINING PROGRAM

## 2025-02-04 PROCEDURE — 3077F SYST BP >= 140 MM HG: CPT | Mod: CPTII,S$GLB,, | Performed by: STUDENT IN AN ORGANIZED HEALTH CARE EDUCATION/TRAINING PROGRAM

## 2025-02-04 PROCEDURE — 3080F DIAST BP >= 90 MM HG: CPT | Mod: CPTII,S$GLB,, | Performed by: STUDENT IN AN ORGANIZED HEALTH CARE EDUCATION/TRAINING PROGRAM

## 2025-02-04 PROCEDURE — 1160F RVW MEDS BY RX/DR IN RCRD: CPT | Mod: CPTII,S$GLB,, | Performed by: STUDENT IN AN ORGANIZED HEALTH CARE EDUCATION/TRAINING PROGRAM

## 2025-02-04 PROCEDURE — 99999 PR PBB SHADOW E&M-EST. PATIENT-LVL III: CPT | Mod: PBBFAC,,, | Performed by: STUDENT IN AN ORGANIZED HEALTH CARE EDUCATION/TRAINING PROGRAM

## 2025-02-04 PROCEDURE — 99204 OFFICE O/P NEW MOD 45 MIN: CPT | Mod: S$GLB,,, | Performed by: STUDENT IN AN ORGANIZED HEALTH CARE EDUCATION/TRAINING PROGRAM

## 2025-02-04 PROCEDURE — 3008F BODY MASS INDEX DOCD: CPT | Mod: CPTII,S$GLB,, | Performed by: STUDENT IN AN ORGANIZED HEALTH CARE EDUCATION/TRAINING PROGRAM

## 2025-02-04 RX ORDER — DICLOFENAC SODIUM 75 MG/1
75 TABLET, DELAYED RELEASE ORAL 2 TIMES DAILY PRN
Qty: 60 TABLET | Refills: 2 | Status: SHIPPED | OUTPATIENT
Start: 2025-02-04

## 2025-02-04 NOTE — PROGRESS NOTES
Chronic Pain - New Consult    Referring Physician: No ref. provider found    Chief Complaint:   Chief Complaint   Patient presents with    Shoulder Pain    Neck Pain          SUBJECTIVE:    Estela Anderson presents to the clinic for the evaluation of right shoulder pain. The pain started 2 years ago following no specific event, although she was working in labor/delivery and symptoms have been worsening.The pain is located in the right shoulder area and radiates to the right neck.  The pain is described as aching and frozen and stiff  and is rated as 3/10. The pain is rated with a score of  1/10 on the BEST day and a score of 10/10 on the WORST day.  Symptoms interfere with daily activity, sleeping, and work. The pain is exacerbated by moving the right shoulder.  She denies pain with moving her neck. The pain is mitigated by rest. The patient reports 6 hours of uninterrupted sleep per night.    Patient denies significant motor weakness and no balance issues. She finds it difficult to  her 4 month old baby due to her shoulder.    Physical Therapy/Home Exercise: yes, did for 3 - 4 months last year, didn't help.  She continues to do home exercises, but she feels the exercises aren't really helping.       Pain Disability Index Review:      2/4/2025     8:14 AM   Last 3 PDI Scores   Pain Disability Index (PDI) 35       Pain Medications:   - ibuprofen (only helps a little)   - tylenol (only helps a little)   - used to do voltaren gel before, but didn't help much     report:  Reviewed and consistent with medication use as prescribed.      Pain Procedures:   8/25/23 - Subacromial bursa injection (with Blessey, didn't help much)    Imaging:   MRI SHOULDER WITH CONTRAST RIGHT     CLINICAL HISTORY:  Shoulder pain, labral tear suspected, xray done;     TECHNIQUE:  MR arthrogram evaluation of the right shoulder performed following the intra-articular administration of contrast.     COMPARISON:  Radiograph 08/14/2023, MRI  "08/23/2023.     FINDINGS:  Examination degraded by patient motion artifact.     ROTATOR CUFF: Supraspinatus, infraspinatus, subscapularis and teres minor tendons are intact.  Muscle bulk is preserved.     LABRUM: Abnormal morphology of the posterior labrum extending from superior to inferior (11-8 o'clock) with associated linear contrast imbibition.  Remaining labral segments demonstrate grossly preserved morphology and signal intensity.     BICEPS: Normal contour and signal intensity.     BONES: Fluid signal intensity foci within the subcortical region of the posterolateral humeral head, unchanged.  No fractures.  No avascular necrosis.  No marrow infiltrative process.     AC JOINT: Unremarkable.     CARTILAGE: Intact without partial or full-thickness defects.     MISCELLANEOUS: Patulous capsule.  Subacromial/subdeltoid bursa intact.  Superior, middle and inferior glenohumeral ligaments are normal.  Coracohumeral ligament is normal.  No axillary lymphadenopathy.     Impression:     1. Posterior labral tear extending from superior to inferior.  2. Patulous capsule.        Electronically signed by:James Mooney MD  Date:                                            12/01/2023  Time:                                           16:45    XR ARTHROGRAM SHOULDER RIGHT, INJECTION ONLY WITH MRI TO FOLLOW (XPD)     CLINICAL HISTORY:  Superior glenoid labrum lesion of right shoulder, initial encounter     TECHNIQUE:  CONSENT:     The patient was informed of the nature of the proposed procedure.  The purposes, alternatives, risks, and benefits were explained and discussed. All questions were answered and written consent was obtained. A "time-out" was performed prior to initiation of the procedure to reconfirm the patient's name, date of birth, and side of procedure.     RADIATION EXPOSURE:     Fluoroscopy time: 26 seconds     PROCEDURE:  A contrast solution was made by mixing 100 mL normal saline and 1.0 mL Gadavist 1 mmol/mL. 7 " "mL of this solution was then mixed with 7 mL Omnipaque-300.     Using fluoroscopic guidance, local lidocaine anesthesia, and sterile technique, a 3.5" x 22 G spinal needle was inserted percutaneously into the right glenohumeral joint. Needle position in the joint was confirmed by injecting approximately 2 mL of Omnipaque-300. 12 mL of the contrast solution were injected into the joint.     There were no immediate complications.     FINDINGS:  Contrast appears intra-articular.  The patient was escorted to MRI for their examination.     Impression:     Fluoroscopically guided right shoulder arthrogram.  MRI will be acquired and dictated separately.     Electronically signed by resident: Pasquale Gaston  Date:                                            2023  Time:                                           15:46     Electronically signed by:John Pablo  Date:                                            2023  Time:                                           16:00    Past Medical History:   Diagnosis Date    Allergy     Anxiety      Past Surgical History:   Procedure Laterality Date    ADENOIDECTOMY      HIP SURGERY      sinus sx      TONSILLECTOMY       Social History     Socioeconomic History    Marital status: Single   Tobacco Use    Smoking status: Never     Passive exposure: Never    Smokeless tobacco: Never   Substance and Sexual Activity    Alcohol use: No    Drug use: No    Sexual activity: Yes     Partners: Male     Birth control/protection: None   Social History Narrative    ALL:Penicillin    PMH:T&A , rare OM, last in 07, scarlet fever in 04    FH:Mother has a mild generalized anxiety disorder and hypertension; PGM has HTN and bipolar disease, PGGM had late onset diabetes mellitus; PGGF had AMI after 50; MGM has fibromyalgia, is a smoker and has alcoholism; MGF has prostate cancer; MGGF had colon cancer; MGGM  of an unknown cancer    SH:Intact family,  younger sibling, no smokers, one dog    School: " A student, swim team                                     Social Drivers of Health     Financial Resource Strain: Low Risk  (8/20/2024)    Overall Financial Resource Strain (CARDIA)     Difficulty of Paying Living Expenses: Not hard at all   Food Insecurity: No Food Insecurity (9/18/2024)    Hunger Vital Sign     Worried About Running Out of Food in the Last Year: Never true     Ran Out of Food in the Last Year: Never true   Transportation Needs: No Transportation Needs (9/18/2024)    TRANSPORTATION NEEDS     Transportation : No   Physical Activity: Insufficiently Active (8/20/2024)    Exercise Vital Sign     Days of Exercise per Week: 2 days     Minutes of Exercise per Session: 20 min   Stress: No Stress Concern Present (8/20/2024)    [employer] - Occupational Stress Questionnaire     Feeling of Stress : Only a little   Housing Stability: Low Risk  (9/18/2024)    Housing Stability Vital Sign     Unable to Pay for Housing in the Last Year: No     Homeless in the Last Year: No     Family History   Problem Relation Name Age of Onset    Bipolar disorder Paternal Grandmother      Hypertension Paternal Grandmother      Mental illness Paternal Grandmother          bipolar    Fibromyalgia Maternal Grandmother      Alcohol abuse Maternal Grandmother      Cancer Maternal Grandfather          prostate cancer    Meningitis Maternal Grandfather      Hypertension Mother Indy     Anxiety disorder Mother Indy     Diabetes Other PGGM         T2DM    Heart disease Other PGGF         AMI death >51yo    Cancer Other MGGM     Cancer Other MGGF         colon cancer    Uterine cancer Neg Hx      Ovarian cancer Neg Hx      Colon cancer Neg Hx      Breast cancer Neg Hx         Review of patient's allergies indicates:   Allergen Reactions    Clarithromycin Nausea Only    Penicillin v Hives       Current Outpatient Medications   Medication Sig    diclofenac (VOLTAREN) 75 MG EC tablet Take 1 tablet (75 mg total)  "by mouth 2 (two) times daily as needed (shoulder joint pain).    labetaloL (NORMODYNE) 100 MG tablet Take 1 tablet (100 mg total) by mouth 2 (two) times daily. (Patient not taking: No sig reported)    norethindrone-e.estradioL-iron (BLISOVI 24 FE) 1 mg-20 mcg (24)/75 mg (4) per tablet Take 1 tablet by mouth once daily.    PNV,calcium 72/iron/folic acid (PRENATAL VITAMIN) Tab Take 1 tablet by mouth once daily. (Patient not taking: Reported on 11/5/2024)    semaglutide, weight loss, (WEGOVY) 1 mg/0.5 mL PnIj Inject 1 mg into the skin every 7 days.    sertraline (ZOLOFT) 50 MG tablet TAKE 1 TABLET BY MOUTH EVERY DAY     No current facility-administered medications for this visit.       REVIEW OF SYSTEMS:    GENERAL:  current fevers or chills, recent use of antibiotics denies.  HEENT:  History of migraines/headaches: denies, History of major throat surgery: denies  RESPIRATORY:  History of home oxygen or pulmonary hypertension/severe breathing dysfunction: denies; Smoking history: denies  CARDIOVASCULAR:  Hx of palpitations/rhythm problems: denies  Hx of Heart Attacks/Surgery: denies  GI:  Recent abdominal discomfort or recent change in bowel habits denies  MUSCULOSKELETAL:  See HPI.  SKIN:  unhealed wounds or rashes: denies  PSYCH: major psychiatric history or recent psychosocial stressors denies  HEMATOLOGY/LYMPHOLOGY:  Hx of prolonged bleeding, Hx of Blood thinner usage: denies  NEURO:   history of seizures, strokes, chronic/old weakness (such as paralysis or paresis of any body part): denies  All other reviewed and negative other than HPI.    OBJECTIVE:    BP (!) 164/133 (BP Location: Left arm, Patient Position: Sitting)   Pulse (!) 125   Ht 5' 10" (1.778 m)   LMP 12/21/2024 (Approximate)   BMI 31.57 kg/m²     PHYSICAL EXAMINATION:  General appearance: Well appearing, in no acute distress, alert and appropriately communicative.  Psych:  Mood and affect appropriate.  Skin: Skin color, texture, turgor normal, no " rashes or lesions, in both upper and lower body for exposed skin.  Head/face:  Atraumatic, normocephalic.  Neck: slight pain on extension/flexion and lateral head turn  Cor: regular rate  Pulm: non-labored breathing  GI: Abdomen non-distended and non-tender.  Extremities: No deformities, significant lymphedema, or skin discoloration. No significant open wounds. No major amputations.  Musculoskeletal: Shoulder provocative maneuvers are negative with the exception of right +Neers, +O'Briens. Bilateral upper and lower extremity strength is normal and symmetric.  No atrophy or tone abnormalities are noted.  Neuro: Bilateral upper and lower extremity coordination and muscle stretch reflexes abnormalities noted: none.  Freitas and/or Clonus: negative; loss of sensation to light touch: none  Gait: normal    CMP  Sodium   Date Value Ref Range Status   08/25/2024 134 (L) 136 - 145 mmol/L Final     Potassium   Date Value Ref Range Status   08/25/2024 3.7 3.5 - 5.1 mmol/L Final     Comment:     Anion Gap reference range revised on 4/28/2023     Chloride   Date Value Ref Range Status   08/25/2024 104 95 - 110 mmol/L Final     CO2   Date Value Ref Range Status   08/25/2024 20 (L) 22 - 31 mmol/L Final     Glucose   Date Value Ref Range Status   08/25/2024 104 70 - 110 mg/dL Final     Comment:     The ADA recommends the following guidelines for fasting glucose:    Normal:       less than 100 mg/dL    Prediabetes:  100 mg/dL to 125 mg/dL    Diabetes:     126 mg/dL or higher       BUN   Date Value Ref Range Status   08/25/2024 8 7 - 18 mg/dL Final     Creatinine   Date Value Ref Range Status   08/25/2024 0.55 0.50 - 1.40 mg/dL Final     Calcium   Date Value Ref Range Status   08/25/2024 9.0 8.4 - 10.2 mg/dL Final     Total Protein   Date Value Ref Range Status   08/25/2024 6.5 6.0 - 8.4 g/dL Final     Albumin   Date Value Ref Range Status   08/25/2024 3.5 3.5 - 5.2 g/dL Final     Total Bilirubin   Date Value Ref Range Status    08/25/2024 0.3 0.2 - 1.3 mg/dL Final     Alkaline Phosphatase   Date Value Ref Range Status   08/25/2024 113 38 - 145 U/L Final     AST   Date Value Ref Range Status   08/25/2024 26 14 - 36 U/L Final     ALT   Date Value Ref Range Status   08/25/2024 29 0 - 35 U/L Final     Anion Gap   Date Value Ref Range Status   08/25/2024 10 5 - 12 mmol/L Final     Comment:     Anion Gap reference range revised on 4/28/2023     eGFR   Date Value Ref Range Status   08/25/2024 >60 >60 mL/min/1.73 m^2 Final         ASSESSMENT: 24 y.o. year old female with chronic pain, consistent with:    1. Labral tear of shoulder, right, initial encounter  diclofenac (VOLTAREN) 75 MG EC tablet    Procedure Order to Pain Management      2. Chronic right shoulder pain  diclofenac (VOLTAREN) 75 MG EC tablet    Procedure Order to Pain Management        IMPRESSION: Estela Anderson presents today for right shoulder pain.  History and physical exam are consistent with right shoulder arthropathy.  My independent interpretation of the imaging is consistent with right shoulder labral tear.  She reports she had no benefit from her subacromial bursa injection. We will try a glenohumeral joint injection.  If this does not help, she may warrant a referral to orthopedic surgery.    PLAN:   - I have stressed the importance of physical activity and a home exercise plan to help with pain and improve health.  - Patient can continue with medications for now since they are providing benefits, using them appropriately, and without side effects.  - schedule for right glenohumeral joint injection  - start diclofenac 75mg BID (she reports she is not pregnant)  - RTC after steroid injection  - Counseled patient regarding the importance of activity modification and physical therapy.    The above plan and management options were discussed at length with patient. Patient is in agreement with the above and verbalized understanding. It will be communicated with the referring  physician via electronic record, fax, or mail.    Chelo López  02/04/2025

## 2025-02-04 NOTE — TELEPHONE ENCOUNTER
----- Message from Jyoti Ibanez sent at 2025  8:38 AM CST -----  Regarding: Order for JEMAL TRIPP    Patient Name: JEMAL TRIPP([id number])  Sex: Female  : 2000      PCP: BAILEE MCCARTNEY    Center: [facility]     Types of orders made on 2025: Medications, Procedure Request    Order Date:2025  Ordering User:JYOTI IBANEZ [[id number]]  Encounter Provider:Jyoti Ibanez MD [[id number]]  Authorizing Provider: Jyoti Ibanez MD [[id number]]  Department:[facility][[id number]]    Common Order Information  Procedure -> Joint/Bursa Injection (Specify joint and laterality) Cmt: Right GH             intra-articular injection    Order Specific Information  Order: Procedure Order to Pain Management [Custom: [id number]]  Order #:          [id number]: 1 FUTURE    Priority: Routine  Class: Clinic Performed    Future Order Information      Expires on:2026            Expected by:2025                   Associated Diagnoses      S43.431A Labral tear of shoulder, right, initial encounter      M25.511, G89.29 Chronic right shoulder pain      Physician -> SHAMAR         Is patient on anti-coagulants? -> No         Facility Name: -> [facility]         Follow-up: -> 2 weeks           Priority: Routine  Class: Clinic Performed    Future Order Information      Expires on:2026            Expected by:2025                   Associated Diagnoses      S43.431A Labral tear of shoulder, right, initial encounter      M25.511, G89.29 Chronic right shoulder pain      Procedure -> Joint/Bursa Injection (Specify joint and laterality) Cmt:                 Right GH intra-articular injection        Physician -> SHAMAR         Is patient on anti-coagulants? -> No         Facility Name: -> [facility]         Follow-up: -> 2 weeks

## 2025-02-04 NOTE — H&P (VIEW-ONLY)
Chronic Pain - New Consult    Referring Physician: No ref. provider found    Chief Complaint:   Chief Complaint   Patient presents with    Shoulder Pain    Neck Pain          SUBJECTIVE:    Estela Anderson presents to the clinic for the evaluation of right shoulder pain. The pain started 2 years ago following no specific event, although she was working in labor/delivery and symptoms have been worsening.The pain is located in the right shoulder area and radiates to the right neck.  The pain is described as aching and frozen and stiff  and is rated as 3/10. The pain is rated with a score of  1/10 on the BEST day and a score of 10/10 on the WORST day.  Symptoms interfere with daily activity, sleeping, and work. The pain is exacerbated by moving the right shoulder.  She denies pain with moving her neck. The pain is mitigated by rest. The patient reports 6 hours of uninterrupted sleep per night.    Patient denies significant motor weakness and no balance issues. She finds it difficult to  her 4 month old baby due to her shoulder.    Physical Therapy/Home Exercise: yes, did for 3 - 4 months last year, didn't help.  She continues to do home exercises, but she feels the exercises aren't really helping.       Pain Disability Index Review:      2/4/2025     8:14 AM   Last 3 PDI Scores   Pain Disability Index (PDI) 35       Pain Medications:   - ibuprofen (only helps a little)   - tylenol (only helps a little)   - used to do voltaren gel before, but didn't help much     report:  Reviewed and consistent with medication use as prescribed.      Pain Procedures:   8/25/23 - Subacromial bursa injection (with Blessey, didn't help much)    Imaging:   MRI SHOULDER WITH CONTRAST RIGHT     CLINICAL HISTORY:  Shoulder pain, labral tear suspected, xray done;     TECHNIQUE:  MR arthrogram evaluation of the right shoulder performed following the intra-articular administration of contrast.     COMPARISON:  Radiograph 08/14/2023, MRI  "08/23/2023.     FINDINGS:  Examination degraded by patient motion artifact.     ROTATOR CUFF: Supraspinatus, infraspinatus, subscapularis and teres minor tendons are intact.  Muscle bulk is preserved.     LABRUM: Abnormal morphology of the posterior labrum extending from superior to inferior (11-8 o'clock) with associated linear contrast imbibition.  Remaining labral segments demonstrate grossly preserved morphology and signal intensity.     BICEPS: Normal contour and signal intensity.     BONES: Fluid signal intensity foci within the subcortical region of the posterolateral humeral head, unchanged.  No fractures.  No avascular necrosis.  No marrow infiltrative process.     AC JOINT: Unremarkable.     CARTILAGE: Intact without partial or full-thickness defects.     MISCELLANEOUS: Patulous capsule.  Subacromial/subdeltoid bursa intact.  Superior, middle and inferior glenohumeral ligaments are normal.  Coracohumeral ligament is normal.  No axillary lymphadenopathy.     Impression:     1. Posterior labral tear extending from superior to inferior.  2. Patulous capsule.        Electronically signed by:James Mooney MD  Date:                                            12/01/2023  Time:                                           16:45    XR ARTHROGRAM SHOULDER RIGHT, INJECTION ONLY WITH MRI TO FOLLOW (XPD)     CLINICAL HISTORY:  Superior glenoid labrum lesion of right shoulder, initial encounter     TECHNIQUE:  CONSENT:     The patient was informed of the nature of the proposed procedure.  The purposes, alternatives, risks, and benefits were explained and discussed. All questions were answered and written consent was obtained. A "time-out" was performed prior to initiation of the procedure to reconfirm the patient's name, date of birth, and side of procedure.     RADIATION EXPOSURE:     Fluoroscopy time: 26 seconds     PROCEDURE:  A contrast solution was made by mixing 100 mL normal saline and 1.0 mL Gadavist 1 mmol/mL. 7 " "mL of this solution was then mixed with 7 mL Omnipaque-300.     Using fluoroscopic guidance, local lidocaine anesthesia, and sterile technique, a 3.5" x 22 G spinal needle was inserted percutaneously into the right glenohumeral joint. Needle position in the joint was confirmed by injecting approximately 2 mL of Omnipaque-300. 12 mL of the contrast solution were injected into the joint.     There were no immediate complications.     FINDINGS:  Contrast appears intra-articular.  The patient was escorted to MRI for their examination.     Impression:     Fluoroscopically guided right shoulder arthrogram.  MRI will be acquired and dictated separately.     Electronically signed by resident: Pasquale Gaston  Date:                                            2023  Time:                                           15:46     Electronically signed by:John Pablo  Date:                                            2023  Time:                                           16:00    Past Medical History:   Diagnosis Date    Allergy     Anxiety      Past Surgical History:   Procedure Laterality Date    ADENOIDECTOMY      HIP SURGERY      sinus sx      TONSILLECTOMY       Social History     Socioeconomic History    Marital status: Single   Tobacco Use    Smoking status: Never     Passive exposure: Never    Smokeless tobacco: Never   Substance and Sexual Activity    Alcohol use: No    Drug use: No    Sexual activity: Yes     Partners: Male     Birth control/protection: None   Social History Narrative    ALL:Penicillin    PMH:T&A , rare OM, last in 07, scarlet fever in 04    FH:Mother has a mild generalized anxiety disorder and hypertension; PGM has HTN and bipolar disease, PGGM had late onset diabetes mellitus; PGGF had AMI after 50; MGM has fibromyalgia, is a smoker and has alcoholism; MGF has prostate cancer; MGGF had colon cancer; MGGM  of an unknown cancer    SH:Intact family,  younger sibling, no smokers, one dog    School: " A student, swim team                                     Social Drivers of Health     Financial Resource Strain: Low Risk  (8/20/2024)    Overall Financial Resource Strain (CARDIA)     Difficulty of Paying Living Expenses: Not hard at all   Food Insecurity: No Food Insecurity (9/18/2024)    Hunger Vital Sign     Worried About Running Out of Food in the Last Year: Never true     Ran Out of Food in the Last Year: Never true   Transportation Needs: No Transportation Needs (9/18/2024)    TRANSPORTATION NEEDS     Transportation : No   Physical Activity: Insufficiently Active (8/20/2024)    Exercise Vital Sign     Days of Exercise per Week: 2 days     Minutes of Exercise per Session: 20 min   Stress: No Stress Concern Present (8/20/2024)    [employer] - Occupational Stress Questionnaire     Feeling of Stress : Only a little   Housing Stability: Low Risk  (9/18/2024)    Housing Stability Vital Sign     Unable to Pay for Housing in the Last Year: No     Homeless in the Last Year: No     Family History   Problem Relation Name Age of Onset    Bipolar disorder Paternal Grandmother      Hypertension Paternal Grandmother      Mental illness Paternal Grandmother          bipolar    Fibromyalgia Maternal Grandmother      Alcohol abuse Maternal Grandmother      Cancer Maternal Grandfather          prostate cancer    Meningitis Maternal Grandfather      Hypertension Mother Indy     Anxiety disorder Mother Indy     Diabetes Other PGGM         T2DM    Heart disease Other PGGF         AMI death >49yo    Cancer Other MGGM     Cancer Other MGGF         colon cancer    Uterine cancer Neg Hx      Ovarian cancer Neg Hx      Colon cancer Neg Hx      Breast cancer Neg Hx         Review of patient's allergies indicates:   Allergen Reactions    Clarithromycin Nausea Only    Penicillin v Hives       Current Outpatient Medications   Medication Sig    diclofenac (VOLTAREN) 75 MG EC tablet Take 1 tablet (75 mg total)  "by mouth 2 (two) times daily as needed (shoulder joint pain).    labetaloL (NORMODYNE) 100 MG tablet Take 1 tablet (100 mg total) by mouth 2 (two) times daily. (Patient not taking: No sig reported)    norethindrone-e.estradioL-iron (BLISOVI 24 FE) 1 mg-20 mcg (24)/75 mg (4) per tablet Take 1 tablet by mouth once daily.    PNV,calcium 72/iron/folic acid (PRENATAL VITAMIN) Tab Take 1 tablet by mouth once daily. (Patient not taking: Reported on 11/5/2024)    semaglutide, weight loss, (WEGOVY) 1 mg/0.5 mL PnIj Inject 1 mg into the skin every 7 days.    sertraline (ZOLOFT) 50 MG tablet TAKE 1 TABLET BY MOUTH EVERY DAY     No current facility-administered medications for this visit.       REVIEW OF SYSTEMS:    GENERAL:  current fevers or chills, recent use of antibiotics denies.  HEENT:  History of migraines/headaches: denies, History of major throat surgery: denies  RESPIRATORY:  History of home oxygen or pulmonary hypertension/severe breathing dysfunction: denies; Smoking history: denies  CARDIOVASCULAR:  Hx of palpitations/rhythm problems: denies  Hx of Heart Attacks/Surgery: denies  GI:  Recent abdominal discomfort or recent change in bowel habits denies  MUSCULOSKELETAL:  See HPI.  SKIN:  unhealed wounds or rashes: denies  PSYCH: major psychiatric history or recent psychosocial stressors denies  HEMATOLOGY/LYMPHOLOGY:  Hx of prolonged bleeding, Hx of Blood thinner usage: denies  NEURO:   history of seizures, strokes, chronic/old weakness (such as paralysis or paresis of any body part): denies  All other reviewed and negative other than HPI.    OBJECTIVE:    BP (!) 164/133 (BP Location: Left arm, Patient Position: Sitting)   Pulse (!) 125   Ht 5' 10" (1.778 m)   LMP 12/21/2024 (Approximate)   BMI 31.57 kg/m²     PHYSICAL EXAMINATION:  General appearance: Well appearing, in no acute distress, alert and appropriately communicative.  Psych:  Mood and affect appropriate.  Skin: Skin color, texture, turgor normal, no " rashes or lesions, in both upper and lower body for exposed skin.  Head/face:  Atraumatic, normocephalic.  Neck: slight pain on extension/flexion and lateral head turn  Cor: regular rate  Pulm: non-labored breathing  GI: Abdomen non-distended and non-tender.  Extremities: No deformities, significant lymphedema, or skin discoloration. No significant open wounds. No major amputations.  Musculoskeletal: Shoulder provocative maneuvers are negative with the exception of right +Neers, +O'Briens. Bilateral upper and lower extremity strength is normal and symmetric.  No atrophy or tone abnormalities are noted.  Neuro: Bilateral upper and lower extremity coordination and muscle stretch reflexes abnormalities noted: none.  Freitas and/or Clonus: negative; loss of sensation to light touch: none  Gait: normal    CMP  Sodium   Date Value Ref Range Status   08/25/2024 134 (L) 136 - 145 mmol/L Final     Potassium   Date Value Ref Range Status   08/25/2024 3.7 3.5 - 5.1 mmol/L Final     Comment:     Anion Gap reference range revised on 4/28/2023     Chloride   Date Value Ref Range Status   08/25/2024 104 95 - 110 mmol/L Final     CO2   Date Value Ref Range Status   08/25/2024 20 (L) 22 - 31 mmol/L Final     Glucose   Date Value Ref Range Status   08/25/2024 104 70 - 110 mg/dL Final     Comment:     The ADA recommends the following guidelines for fasting glucose:    Normal:       less than 100 mg/dL    Prediabetes:  100 mg/dL to 125 mg/dL    Diabetes:     126 mg/dL or higher       BUN   Date Value Ref Range Status   08/25/2024 8 7 - 18 mg/dL Final     Creatinine   Date Value Ref Range Status   08/25/2024 0.55 0.50 - 1.40 mg/dL Final     Calcium   Date Value Ref Range Status   08/25/2024 9.0 8.4 - 10.2 mg/dL Final     Total Protein   Date Value Ref Range Status   08/25/2024 6.5 6.0 - 8.4 g/dL Final     Albumin   Date Value Ref Range Status   08/25/2024 3.5 3.5 - 5.2 g/dL Final     Total Bilirubin   Date Value Ref Range Status    08/25/2024 0.3 0.2 - 1.3 mg/dL Final     Alkaline Phosphatase   Date Value Ref Range Status   08/25/2024 113 38 - 145 U/L Final     AST   Date Value Ref Range Status   08/25/2024 26 14 - 36 U/L Final     ALT   Date Value Ref Range Status   08/25/2024 29 0 - 35 U/L Final     Anion Gap   Date Value Ref Range Status   08/25/2024 10 5 - 12 mmol/L Final     Comment:     Anion Gap reference range revised on 4/28/2023     eGFR   Date Value Ref Range Status   08/25/2024 >60 >60 mL/min/1.73 m^2 Final         ASSESSMENT: 24 y.o. year old female with chronic pain, consistent with:    1. Labral tear of shoulder, right, initial encounter  diclofenac (VOLTAREN) 75 MG EC tablet    Procedure Order to Pain Management      2. Chronic right shoulder pain  diclofenac (VOLTAREN) 75 MG EC tablet    Procedure Order to Pain Management        IMPRESSION: Estela Anderson presents today for right shoulder pain.  History and physical exam are consistent with right shoulder arthropathy.  My independent interpretation of the imaging is consistent with right shoulder labral tear.  She reports she had no benefit from her subacromial bursa injection. We will try a glenohumeral joint injection.  If this does not help, she may warrant a referral to orthopedic surgery.    PLAN:   - I have stressed the importance of physical activity and a home exercise plan to help with pain and improve health.  - Patient can continue with medications for now since they are providing benefits, using them appropriately, and without side effects.  - schedule for right glenohumeral joint injection  - start diclofenac 75mg BID (she reports she is not pregnant)  - RTC after steroid injection  - Counseled patient regarding the importance of activity modification and physical therapy.    The above plan and management options were discussed at length with patient. Patient is in agreement with the above and verbalized understanding. It will be communicated with the referring  physician via electronic record, fax, or mail.    Chelo López  02/04/2025

## 2025-02-07 ENCOUNTER — HOSPITAL ENCOUNTER (OUTPATIENT)
Facility: HOSPITAL | Age: 25
Discharge: HOME | End: 2025-02-07
Attending: STUDENT IN AN ORGANIZED HEALTH CARE EDUCATION/TRAINING PROGRAM | Admitting: STUDENT IN AN ORGANIZED HEALTH CARE EDUCATION/TRAINING PROGRAM
Payer: COMMERCIAL

## 2025-02-07 ENCOUNTER — PATIENT MESSAGE (OUTPATIENT)
Dept: INTERNAL MEDICINE | Facility: CLINIC | Age: 25
End: 2025-02-07

## 2025-02-07 ENCOUNTER — TELEPHONE (OUTPATIENT)
Dept: PAIN MEDICINE | Facility: CLINIC | Age: 25
End: 2025-02-07
Payer: COMMERCIAL

## 2025-02-07 VITALS
TEMPERATURE: 98 F | HEART RATE: 81 BPM | DIASTOLIC BLOOD PRESSURE: 89 MMHG | SYSTOLIC BLOOD PRESSURE: 160 MMHG | RESPIRATION RATE: 16 BRPM | WEIGHT: 210 LBS | OXYGEN SATURATION: 100 % | BODY MASS INDEX: 30.13 KG/M2

## 2025-02-07 DIAGNOSIS — G89.29 CHRONIC PAIN: ICD-10-CM

## 2025-02-07 DIAGNOSIS — M25.511 CHRONIC RIGHT SHOULDER PAIN: Primary | ICD-10-CM

## 2025-02-07 DIAGNOSIS — G89.29 CHRONIC RIGHT SHOULDER PAIN: Primary | ICD-10-CM

## 2025-02-07 DIAGNOSIS — S43.431A LABRAL TEAR OF SHOULDER, RIGHT, INITIAL ENCOUNTER: ICD-10-CM

## 2025-02-07 LAB
B-HCG UR QL: NEGATIVE
CTP QC/QA: YES

## 2025-02-07 PROCEDURE — 81025 URINE PREGNANCY TEST: CPT | Performed by: STUDENT IN AN ORGANIZED HEALTH CARE EDUCATION/TRAINING PROGRAM

## 2025-02-07 PROCEDURE — 63600175 PHARM REV CODE 636 W HCPCS: Performed by: STUDENT IN AN ORGANIZED HEALTH CARE EDUCATION/TRAINING PROGRAM

## 2025-02-07 PROCEDURE — 20610 DRAIN/INJ JOINT/BURSA W/O US: CPT | Mod: RT,,, | Performed by: STUDENT IN AN ORGANIZED HEALTH CARE EDUCATION/TRAINING PROGRAM

## 2025-02-07 PROCEDURE — 20610 DRAIN/INJ JOINT/BURSA W/O US: CPT | Mod: RT | Performed by: STUDENT IN AN ORGANIZED HEALTH CARE EDUCATION/TRAINING PROGRAM

## 2025-02-07 PROCEDURE — 25500020 PHARM REV CODE 255: Performed by: STUDENT IN AN ORGANIZED HEALTH CARE EDUCATION/TRAINING PROGRAM

## 2025-02-07 RX ORDER — LIDOCAINE HYDROCHLORIDE 20 MG/ML
INJECTION, SOLUTION EPIDURAL; INFILTRATION; INTRACAUDAL; PERINEURAL
Status: DISCONTINUED | OUTPATIENT
Start: 2025-02-07 | End: 2025-02-07 | Stop reason: HOSPADM

## 2025-02-07 RX ORDER — ALPRAZOLAM 0.5 MG/1
0.5 TABLET, ORALLY DISINTEGRATING ORAL ONCE AS NEEDED
Status: DISCONTINUED | OUTPATIENT
Start: 2025-02-07 | End: 2025-02-07 | Stop reason: HOSPADM

## 2025-02-07 RX ORDER — BUPIVACAINE HYDROCHLORIDE 2.5 MG/ML
INJECTION, SOLUTION EPIDURAL; INFILTRATION; INTRACAUDAL
Status: DISCONTINUED | OUTPATIENT
Start: 2025-02-07 | End: 2025-02-07 | Stop reason: HOSPADM

## 2025-02-07 RX ORDER — TRIAMCINOLONE ACETONIDE 40 MG/ML
INJECTION, SUSPENSION INTRA-ARTICULAR; INTRAMUSCULAR
Status: DISCONTINUED | OUTPATIENT
Start: 2025-02-07 | End: 2025-02-07 | Stop reason: HOSPADM

## 2025-02-07 NOTE — DISCHARGE SUMMARY
Discharge Note  Short Stay      SUMMARY     Admit Date: 2/7/2025    Attending Physician: Chelo López MD PhD    Discharge Physician: Chelo López      Discharge Date: 2/7/2025 3:04 PM    Procedure(s) (LRB):  Right GH intra-articular injection (Right)    Final Diagnosis: Labral tear of shoulder, right, initial encounter [S43.431A]    Disposition: Home or self care    Patient Instructions:   Current Discharge Medication List        CONTINUE these medications which have NOT CHANGED    Details   norethindrone-e.estradioL-iron (BLISOVI 24 FE) 1 mg-20 mcg (24)/75 mg (4) per tablet Take 1 tablet by mouth once daily.  Qty: 90 tablet, Refills: 3      diclofenac (VOLTAREN) 75 MG EC tablet Take 1 tablet (75 mg total) by mouth 2 (two) times daily as needed (shoulder joint pain).  Qty: 60 tablet, Refills: 2    Associated Diagnoses: Labral tear of shoulder, right, initial encounter; Chronic right shoulder pain      semaglutide, weight loss, (WEGOVY) 1 mg/0.5 mL PnIj Inject 1 mg into the skin every 7 days.  Qty: 2 mL, Refills: 0    Associated Diagnoses: Obesity, Class I, BMI 30-34.9           STOP taking these medications       sertraline (ZOLOFT) 50 MG tablet Comments:   Reason for Stopping:         labetaloL (NORMODYNE) 100 MG tablet Comments:   Reason for Stopping:         PNV,calcium 72/iron/folic acid (PRENATAL VITAMIN) Tab Comments:   Reason for Stopping:                   Discharge Diagnosis: Labral tear of shoulder, right, initial encounter [S43.431A]  Condition on Discharge: Stable with no complications to procedure   Diet on Discharge: Same as before.  Activity: as per instruction sheet.  Discharge to: Home with a responsible adult.  Follow up: 2-4 weeks       Please call my office or pager at [phone] if experienced any weakness or loss of sensation, fever > 101.5, pain uncontrolled with oral medications, persistent nausea/vomiting/or diarrhea, redness or drainage from the incisions, or any other worrisome concerns.  If physician on call was not reached or could not communicate with our office for any reason please go to the nearest emergency department      Chelo López MD PhD

## 2025-02-07 NOTE — PLAN OF CARE
Pt in preop bay 25, VSS and IV inserted. Pt denies any open wounds on body or the use of any weight loss injections. Pt needs an  updated H&P, procedural consents, an admit order and anesthesia consents, otherwise ready to roll.

## 2025-02-07 NOTE — INTERVAL H&P NOTE
The patient was examined and no significant changes were noted from the updated H&P or last clinic note.    The risks and benefits of this procedure, including alternative therapies, were discussed with the patient.  The discussion of risks included infection, bleeding, need for additional procedures or surgery, nerve damage, paralysis, adverse medication reaction(s), stroke, and if appropriate for the procedure, death.  Questions regarding the procedure, risks, expected outcome, and possible side effects were solicited and answered to Estela's satisfaction.  Estela Justin wishes to proceed with the injection or procedure as confirmed by written consent.

## 2025-02-07 NOTE — PLAN OF CARE
Pt d/c with vitals stable and voiced understanding of d/c instructions.  Pt was escorted to waiting room as she had no sedation.

## 2025-02-07 NOTE — OP NOTE
Glenohumeral Joint Injection under Fluoroscopic Guidance    The procedure, risks, benefits, and options were discussed with the patient. There are no contraindications to the procedure. The patent expressed understanding and agreed to the procedure. Informed written consent was obtained prior to the start of the procedure and can be found in the patient's chart.    PATIENT NAME: Estela Anderson   MRN: [id number]     DATE OF PROCEDURE: 02/07/2025    PROCEDURE: Right Glenohumeral Joint Injection under Fluoroscopic Guidance    PRE-OP DIAGNOSIS: Labral tear of shoulder, right, initial encounter [S43.431A]    POST-OP DIAGNOSIS: Same    PHYSICIAN: Chelo López MD    ASSISTANTS: None     MEDICATIONS INJECTED: Preservative-free Kenalog 40mg with 3cc of Bupivacine 0.25%     LOCAL ANESTHETIC INJECTED: Xylocaine 2%     SEDATION: None    ESTIMATED BLOOD LOSS: None    COMPLICATIONS: None    TECHNIQUE: Time-out was performed to identify the patient and procedure to be performed. With the patient laying in a supine position, the surgical area was prepped and draped in the usual sterile fashion using ChloraPrep and a fenestrated drape. The glenohumeral joint of the shoulder was identified under fluoroscopy guidance. Skin anesthesia was achieved by injecting Lidocaine 2% over the injection site. A 25 gauge,  3.5 inch spinal quinke needle was slowly advanced through under fluoroscopic guidance into the glenohumeral joint until os was encountered and then retracted approximately 2 mm. Once the needle tip was in the area of the joint, and there was no blood,  Contrast dye  Omnipaque (300mg/mL) was injected to confirm placement and there was no vascular runoff. 4 mL of the medication mixture listed above was injected slowly in to the joint. Displacement of the radio opaque contrast after injection of the medication confirmed intra-articular contrast spread in the joint. The needles were removed and bleeding was nil. A sterile dressing was  applied. No specimens collected. The patient tolerated the procedure well.     The patient was monitored after the procedure in the recovery area. They were given post-procedure and discharge instructions to follow at home. The patient was discharged in a stable condition.    Chelo López MD

## 2025-02-10 PROBLEM — E66.811 OBESITY, CLASS I, BMI 30-34.9: Status: ACTIVE | Noted: 2025-02-10

## 2025-02-22 ENCOUNTER — PATIENT MESSAGE (OUTPATIENT)
Dept: PAIN MEDICINE | Facility: CLINIC | Age: 25
End: 2025-02-22
Payer: COMMERCIAL

## 2025-02-22 DIAGNOSIS — S43.431A LABRAL TEAR OF SHOULDER, RIGHT, INITIAL ENCOUNTER: Primary | ICD-10-CM

## 2025-02-26 ENCOUNTER — PATIENT MESSAGE (OUTPATIENT)
Dept: PAIN MEDICINE | Facility: OTHER | Age: 25
End: 2025-02-26
Payer: COMMERCIAL

## 2025-02-26 ENCOUNTER — PATIENT MESSAGE (OUTPATIENT)
Dept: INTERNAL MEDICINE | Facility: CLINIC | Age: 25
End: 2025-02-26
Payer: COMMERCIAL

## 2025-02-26 DIAGNOSIS — S43.439A GLENOID LABRUM TEAR, UNSPECIFIED LATERALITY, INITIAL ENCOUNTER: Primary | ICD-10-CM

## 2025-03-10 ENCOUNTER — PATIENT MESSAGE (OUTPATIENT)
Dept: PAIN MEDICINE | Facility: CLINIC | Age: 25
End: 2025-03-10
Payer: COMMERCIAL

## 2025-03-14 ENCOUNTER — PATIENT MESSAGE (OUTPATIENT)
Dept: INTERNAL MEDICINE | Facility: CLINIC | Age: 25
End: 2025-03-14

## 2025-03-14 ENCOUNTER — OFFICE VISIT (OUTPATIENT)
Dept: INTERNAL MEDICINE | Facility: CLINIC | Age: 25
End: 2025-03-14
Payer: COMMERCIAL

## 2025-03-14 DIAGNOSIS — E66.811 OBESITY, CLASS I, BMI 30-34.9: Primary | ICD-10-CM

## 2025-03-14 RX ORDER — TIRZEPATIDE 2.5 MG/.5ML
2.5 INJECTION, SOLUTION SUBCUTANEOUS
Qty: 2 ML | Refills: 0 | Status: SHIPPED | OUTPATIENT
Start: 2025-03-14

## 2025-03-14 RX ORDER — TIRZEPATIDE 5 MG/.5ML
5 INJECTION, SOLUTION SUBCUTANEOUS
Qty: 2 ML | Refills: 1 | Status: SHIPPED | OUTPATIENT
Start: 2025-03-14

## 2025-03-14 NOTE — PROGRESS NOTES
Patient ID: Estela Anderson is a 24 y.o. White female    Subjective  Chief Complaint: patient presents for medical weight loss management.    Co-morbidities: none      HPI: Patient [facility] in November 2024 and is not currently on therapy. Patient's last fill was in December 2024 for Wegovy 1 mg. Pt had held off on filling medication due to costs and other financial constraints. Pt reported significant nausea with therapy, although was previously on compounded semaglutide but had similar experience at that time.     Weight loss history:  Starting weight:    10/29/2024   Recent Readings    Weight (lbs) 229 lb    BMI 32.85 BMI    Current weight:    12/29/2024   Recent Readings    Weight (lbs) 219 lb    BMI 31.42 BMI    % weight loss since GLP-1 initiation: 4.4 %    Objective  Lab Results   Component Value Date     (L) 08/25/2024     (L) 08/21/2024     (L) 08/19/2024     Lab Results   Component Value Date    K 3.7 08/25/2024    K 3.6 08/21/2024    K 4.1 08/19/2024     Lab Results   Component Value Date     08/25/2024     08/21/2024     08/19/2024     Lab Results   Component Value Date    CO2 20 (L) 08/25/2024    CO2 21 (L) 08/21/2024    CO2 21 (L) 08/19/2024     Lab Results   Component Value Date    BUN 8 08/25/2024    BUN 6 (L) 08/21/2024    BUN 7 08/19/2024     Lab Results   Component Value Date     08/25/2024     (H) 08/21/2024     08/19/2024     Lab Results   Component Value Date    CALCIUM 9.0 08/25/2024    CALCIUM 8.9 08/21/2024    CALCIUM 8.8 08/19/2024     Lab Results   Component Value Date    PROT 6.5 08/25/2024    PROT 6.1 08/21/2024    PROT 6.1 08/19/2024     Lab Results   Component Value Date    ALBUMIN 3.5 08/25/2024    ALBUMIN 3.2 (L) 08/21/2024    ALBUMIN 3.3 (L) 08/19/2024     Lab Results   Component Value Date    BILITOT 0.3 08/25/2024    BILITOT 0.3 08/21/2024    BILITOT 0.4 08/19/2024     Lab Results    Component Value Date    AST 26 08/25/2024    AST 20 08/21/2024    AST 21 08/19/2024     Lab Results   Component Value Date    ALT 29 08/25/2024    ALT 19 08/21/2024    ALT 19 08/19/2024     Lab Results   Component Value Date    ANIONGAP 10 08/25/2024    ANIONGAP 6 08/21/2024    ANIONGAP 5 08/19/2024     Lab Results   Component Value Date    CREATININE 0.55 08/25/2024    CREATININE 0.60 08/21/2024    CREATININE 0.60 08/21/2024     Lab Results   Component Value Date    EGFRNORACEVR >60 08/25/2024    EGFRNORACEVR >60 08/21/2024    EGFRNORACEVR >60 08/19/2024     Assessment/Plan  - Initiate Zepbound 2.5 mg SQ weekly x 4 weeks  - Then increase to Zepbound 5 mg SQ weekly  - RTC in 3 months for follow-up evaluation    Patient consented to pharmacist management via collaborative practice.

## 2025-03-17 ENCOUNTER — PATIENT MESSAGE (OUTPATIENT)
Dept: PAIN MEDICINE | Facility: CLINIC | Age: 25
End: 2025-03-17
Payer: COMMERCIAL

## 2025-03-17 DIAGNOSIS — S43.439A GLENOID LABRUM TEAR, UNSPECIFIED LATERALITY, INITIAL ENCOUNTER: Primary | ICD-10-CM

## 2025-03-27 ENCOUNTER — OFFICE VISIT (OUTPATIENT)
Dept: SPORTS MEDICINE | Facility: CLINIC | Age: 25
End: 2025-03-27
Payer: COMMERCIAL

## 2025-03-27 VITALS
BODY MASS INDEX: 33.33 KG/M2 | HEART RATE: 97 BPM | SYSTOLIC BLOOD PRESSURE: 130 MMHG | HEIGHT: 70 IN | DIASTOLIC BLOOD PRESSURE: 89 MMHG | WEIGHT: 232.81 LBS

## 2025-03-27 DIAGNOSIS — S43.439A GLENOID LABRUM TEAR, UNSPECIFIED LATERALITY, INITIAL ENCOUNTER: Primary | ICD-10-CM

## 2025-03-27 DIAGNOSIS — M25.511 CHRONIC RIGHT SHOULDER PAIN: ICD-10-CM

## 2025-03-27 DIAGNOSIS — G89.29 CHRONIC RIGHT SHOULDER PAIN: ICD-10-CM

## 2025-03-27 PROCEDURE — 1160F RVW MEDS BY RX/DR IN RCRD: CPT | Mod: CPTII,S$GLB,, | Performed by: FAMILY MEDICINE

## 2025-03-27 PROCEDURE — 99999 PR PBB SHADOW E&M-EST. PATIENT-LVL III: CPT | Mod: PBBFAC,,, | Performed by: FAMILY MEDICINE

## 2025-03-27 PROCEDURE — 1159F MED LIST DOCD IN RCRD: CPT | Mod: CPTII,S$GLB,, | Performed by: FAMILY MEDICINE

## 2025-03-27 PROCEDURE — 3008F BODY MASS INDEX DOCD: CPT | Mod: CPTII,S$GLB,, | Performed by: FAMILY MEDICINE

## 2025-03-27 PROCEDURE — 99214 OFFICE O/P EST MOD 30 MIN: CPT | Mod: S$GLB,,, | Performed by: FAMILY MEDICINE

## 2025-03-27 PROCEDURE — 3079F DIAST BP 80-89 MM HG: CPT | Mod: CPTII,S$GLB,, | Performed by: FAMILY MEDICINE

## 2025-03-27 PROCEDURE — 3075F SYST BP GE 130 - 139MM HG: CPT | Mod: CPTII,S$GLB,, | Performed by: FAMILY MEDICINE

## 2025-03-27 NOTE — PROGRESS NOTES
HISTORY OF PRESENT ILLNESS   Estela Anderson, a 24 y.o. female, presents today for evaluation of her right SHOULDER. Patient is right hand dominant.    Patient reports onset of chronic pain beginning 2 years ago.. Patient reports no known injury or trauma. Pain is located along anterior and posterior aspect of SHOULDER. Pain is 3/10 at present & up to 10/10 with provacative activity including  working and reaching overhead . Pain is described as sharp. Patient states pain does radiate in to neck and scapula.     Associated symptoms include popping and clicking. Pain is aggravated by activities above & occur daily . Symptoms do not interfere with sleep. Patient reports pain & symptoms are getting worse. Patient reports no prior surgery to SHOULDER. She has a history of a labral tear     Prior treatment Estela Anderson has tried   OTC Acetaminophen - No  OTC NSAID - No   Rx NSAID - Yes - Voltaren    Rx Narcotic/Other - No   Brace - No   Injection - Cortisone - Yes - [facility]AG[facility] 1 month ago with   Injection - Biologics - No   Activity Modification - Yes  Physical Therapy - Yes -  November 2023  Home Exercise Program - No  Assistive Device - No  Other - ice and heat    Review of systems (ROS):  A 10+ review of systems was performed with pertinent positives and negatives noted above in the history of present illness. Other systems were negative unless otherwise specified.    PHYSICAL EXAMINATION  General:  The patient is alert and oriented x 3. Mood is pleasant. Observation of ears, eyes and nose reveal no gross abnormalities. HEENT: NCAT, sclera anicteric. Lungs: Respirations are equal and unlabored.     SHOULDER EXAMINATION     OBSERVATION:     Swelling  none  Deformity  none   Discoloration  none   Scapular winging none   Scars   none  Atrophy  none    TENDERNESS / CREPITUS (T/C):          T/C      T/C   Clavicle   -/-  SUPRAspinatus    -/-     AC Jt.    -/-  INFRAspinatus  -/-    SC Jt.    -/-  Deltoid    -/-      G.  Tuberosity  -/-  LH BICEP groove  -/-   Acromion:  -/-  Midline Neck   -/-     Scapular Spine -/-  Trapezium   -/-   SMA Scapula  -/-  GH jt. line - post  -/-     Scapulothoracic  -/-         ROM:     Right shoulder   Left shoulder        AROM (PROM)   AROM (PROM)   FE    170° (175°)     170° (175°)     ER at 0°    60°  (65°)    60°  (65°)   ER at 90° ABD  90°  (90°)    90°  (90°)   IR at 90°  ABD   NA  (40°)     NA  (40°)      IR (spine level)   T10     T10    STRENGTH: (* = with pain) RIGHT SHOULDER  LEFT SHOULDER   SCAPTION   5/5    5/5    IR    5/5    5/5   ER    5/5    5/5   BICEPS   5/5    5/5   Deltoid    5/5    5/5     SIGNS:  Painful side       NEER   -   OASAELS        -    MUSTAFA   -   SPEEDS        -   DROP ARM   -   BELLY PRESS       -    X-Body ADD    -   LIFT-OFF        -   HORNBLOWERS      -              STABILITY TESTING   RIGHT SHOULDER  LEFT SHOULDER     Translation     Anterior up face    up face    Posterior up face   up face    Sulcus  < 10mm   < 10 mm     Signs   Apprehension   neg     neg       Relocation   no change    no change      Jerk test  neg    neg    EXTREMITY NEURO-VASCULAR EXAM    Sensation grossly intact to light touch all dermatomal regions.    DTR 2+ Biceps, Triceps, BR and Negative Candidos sign   Grossly intact motor function at Elbow, Wrist and Hand   Distal pulses radial and ulnar 2+, brisk cap refill, symmetric.      NECK:  Painless FROM and spinous processes non-tender. Negative Spurlings sign.       Other Findings:    ASSESSMENT & PLAN  Assessment  #1 Glenoid labral tearing, right /d    No evidence of neurologic pathology  No evidence of vascular pathology    Imaging studies reviewed:   X-ray shoulder, right 23.08  MRI shoulder right 23.08  MRIa    Plan  She has had months of fPT, and multiple CSI both intra- and extra-articular have not had desired effect.     We discussed options including    Watchful waiting / relative rest    Physical therapy Discussed  restart  discussed; deferred by pt   Injection therapy Discussed re inject  discussed; deferred by pt   Consultation W/ Team Paula re: joe labr repair   The patient chooses As above   x = prescribed  CSI = corticosteroid injection  VSI = viscosupplement injection  PRPI = platelet rich plasma injection  ia = intra articular  R = right  L = left  B = bilateral   nfSx = surgical consultation was recommended, but patient is not interested in consultation at this time    Physical Therapy        Formal (fPT), @ [facility] facility    Formal (fPT), @ [facility] facility        Homegoing (hgPT), per concurrent fPT recommendations    Homegoing (hgPT), per prior fPT recommendations    Homegoing (hgPT), handout provided        w/  (atPT)    [blank] = not prescribed  x = prescribed  b = prescribed, and begin as indicated  t = continue as indicated  r = prescribed, and restart as indicated  p = completed prior as indicated  hs = prescribed, and with high school   col = prescribed, and with college or university   nfPT = physical therapy was recommended, but patient is not interested in PT at this time    Activity (e.g. sports, work) restrictions    [blank] = as tolerated  pt = per physical therapist  at = per   NWB = non weight bearing on affected lower extremity, with crutches assistance for ambulation    Bracing    [blank] = not prescribed  r = recommended, but not fit with at todays visit  f = prescribed and fit with at todays visit  t = continue as indicated  d = d/c  p = as needed  rare = use on rare, as-needed basis; advised against chronic use    Pain management    [blank] = No prescription necessary. A handout detailing dosing of appropriate   over-the-counter musculoskeletal analgesics was made available to the patient.   m = meloxicam x 14 days  mp = 14 day course of meloxicam prescribed prior    Follow up ns   [blank] = as needed  [number] = in [number]  weeks  CSI = for corticosteroid injection  VSI = for viscosupplement injection or injection series  PRP = for platelet rich plasma injection or injection series  MRI = after MRI imaging  ns = should surgical options be deferred (no surgery)  o = appointment offered, deferred by patient    Should symptoms worsen or fail to resolve, consider    Revisiting the above options and / or      Vocation:

## 2025-04-08 ENCOUNTER — HOSPITAL ENCOUNTER (OUTPATIENT)
Dept: RADIOLOGY | Facility: HOSPITAL | Age: 25
Discharge: HOME | End: 2025-04-08
Attending: STUDENT IN AN ORGANIZED HEALTH CARE EDUCATION/TRAINING PROGRAM
Payer: COMMERCIAL

## 2025-04-08 ENCOUNTER — OFFICE VISIT (OUTPATIENT)
Dept: SPORTS MEDICINE | Facility: CLINIC | Age: 25
End: 2025-04-08
Payer: COMMERCIAL

## 2025-04-08 VITALS
HEIGHT: 70 IN | SYSTOLIC BLOOD PRESSURE: 137 MMHG | HEART RATE: 93 BPM | WEIGHT: 230 LBS | BODY MASS INDEX: 32.93 KG/M2 | DIASTOLIC BLOOD PRESSURE: 91 MMHG

## 2025-04-08 DIAGNOSIS — M25.511 RIGHT SHOULDER PAIN, UNSPECIFIED CHRONICITY: ICD-10-CM

## 2025-04-08 DIAGNOSIS — S43.431A GLENOID LABRAL TEAR, RIGHT, INITIAL ENCOUNTER: ICD-10-CM

## 2025-04-08 DIAGNOSIS — M25.511 ACUTE PAIN OF RIGHT SHOULDER: ICD-10-CM

## 2025-04-08 DIAGNOSIS — S43.431A SUPERIOR LABRUM ANTERIOR-TO-POSTERIOR (SLAP) TEAR OF RIGHT SHOULDER: Primary | ICD-10-CM

## 2025-04-08 PROCEDURE — 73030 X-RAY EXAM OF SHOULDER: CPT | Mod: 26,RT,, | Performed by: RADIOLOGY

## 2025-04-08 PROCEDURE — 3075F SYST BP GE 130 - 139MM HG: CPT | Mod: CPTII,S$GLB,, | Performed by: STUDENT IN AN ORGANIZED HEALTH CARE EDUCATION/TRAINING PROGRAM

## 2025-04-08 PROCEDURE — 3080F DIAST BP >= 90 MM HG: CPT | Mod: CPTII,S$GLB,, | Performed by: STUDENT IN AN ORGANIZED HEALTH CARE EDUCATION/TRAINING PROGRAM

## 2025-04-08 PROCEDURE — 1160F RVW MEDS BY RX/DR IN RCRD: CPT | Mod: CPTII,S$GLB,, | Performed by: STUDENT IN AN ORGANIZED HEALTH CARE EDUCATION/TRAINING PROGRAM

## 2025-04-08 PROCEDURE — 73030 X-RAY EXAM OF SHOULDER: CPT | Mod: TC,RT

## 2025-04-08 PROCEDURE — 3008F BODY MASS INDEX DOCD: CPT | Mod: CPTII,S$GLB,, | Performed by: STUDENT IN AN ORGANIZED HEALTH CARE EDUCATION/TRAINING PROGRAM

## 2025-04-08 PROCEDURE — 1159F MED LIST DOCD IN RCRD: CPT | Mod: CPTII,S$GLB,, | Performed by: STUDENT IN AN ORGANIZED HEALTH CARE EDUCATION/TRAINING PROGRAM

## 2025-04-08 PROCEDURE — 99214 OFFICE O/P EST MOD 30 MIN: CPT | Mod: S$GLB,,, | Performed by: STUDENT IN AN ORGANIZED HEALTH CARE EDUCATION/TRAINING PROGRAM

## 2025-04-08 PROCEDURE — 99999 PR PBB SHADOW E&M-EST. PATIENT-LVL III: CPT | Mod: PBBFAC,,, | Performed by: STUDENT IN AN ORGANIZED HEALTH CARE EDUCATION/TRAINING PROGRAM

## 2025-04-09 NOTE — PROGRESS NOTES
Subjective:          Chief Complaint: Estela Anderson is a 24 y.o. female who had concerns including Pain of the Right Shoulder.    HPI 4/8/2025  History of Present Illness    CHIEF COMPLAINT:  - Right shoulder pain    HPI:  Client presents with right shoulder pain ongoing for approximately 2.5 years. Pain is primarily concentrated in the front of the shoulder and sometimes radiates up the neck when turning the head. It feels restricted with upward reaching and forward movements. Pain severity ranges from 2/10 at rest to 10/10 pain with movement. She rates shoulder function as 50% normal on a scale of 0-100.    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 Currently working at [employer], which involves less lifting but still requires moving and assisting older patients. She reports difficulty carrying a 6-month-old child due to pain, stating inability to remove shirt over head when pain is at its worst. The condition affects daily activities consistently.    Previous treatments include ineffective PT for five months and injections. She saw Dr. Moore two years ago, who recommended PT.    She mentions a history of labral tear surgery in the hip at age 19 due to a dance background, but reports the hip is currently fine. She denies any particular injury causing the shoulder pain, numbness or tingling in the right hand, dislocation of joints, or being super flexible and stretchy.    PREVIOUS TREATMENTS:  - PT: Approximately 5 months, provided no relief  - Injection: Administered by Dr. López at [facility], provided no benefit    WORK STATUS:  - Works as a nurse at [employer]  - Duties include pre and post-op care, and sometimes intra-endoscopic procedures  - Job involves lifting and moving patients, particularly older patients needing assistance  - Reports difficulty with reaching and lifting, causing significant right shoulder pain  - Pain affects ability  to perform daily tasks at work  - Work duties exacerbate shoulder pain          Past Medical History:   Diagnosis Date    Allergy     Anxiety        Medications Ordered Prior to Encounter[1]    Past Surgical History:   Procedure Laterality Date    ADENOIDECTOMY      HIP SURGERY      INJECTION OF JOINT Right 2/7/2025    Procedure: Right GH intra-articular injection;  Surgeon: Chelo López MD;  Location: [facility];  Service: Pain Management;  Laterality: Right;  no AC    sinus sx      TONSILLECTOMY         Family History   Problem Relation Name Age of Onset    Bipolar disorder Paternal Grandmother      Hypertension Paternal Grandmother      Mental illness Paternal Grandmother          bipolar    Fibromyalgia Maternal Grandmother      Alcohol abuse Maternal Grandmother      Cancer Maternal Grandfather          prostate cancer    Meningitis Maternal Grandfather      Hypertension Mother Indy     Anxiety disorder Mother Indy     Diabetes Other PGGM         T2DM    Heart disease Other PGGF         AMI death >51yo    Cancer Other MGGM     Cancer Other MGGF         colon cancer    Uterine cancer Neg Hx      Ovarian cancer Neg Hx      Colon cancer Neg Hx      Breast cancer Neg Hx         Social History[2]   Review of Systems   Constitutional: Negative.   HENT: Negative.     Eyes: Negative.    Cardiovascular: Negative.    Respiratory: Negative.     Endocrine: Negative.    Hematologic/Lymphatic: Negative.    Skin: Negative.    Musculoskeletal:  Positive for joint pain and muscle weakness.   Neurological: Negative.    Psychiatric/Behavioral: Negative.     Allergic/Immunologic: Negative.        Pain Related Questions  Over the past 3 days, what was your average pain during activity? (I.e. running, jogging, walking, climbing stairs, getting dressed, ect.): 5  Over the past 3 days, what was your highest pain level?: 9  Over the past 3 days, what was your lowest pain level? : 0    Other  How many nights a week are you  awakened by your affected body part?: 3  Was the patient's HEIGHT measured or patient reported?: Patient Reported  Was the patient's WEIGHT measured or patient reported?: Measured      Objective:        General: Estela is well-developed, well-nourished, appears stated age, in no acute distress, alert and oriented to time, place and person.     General    Nursing note and vitals reviewed.  Constitutional: She is oriented to person, place, and time. She appears well-developed and well-nourished. No distress.   HENT:   Head: Normocephalic and atraumatic.   Nose: Nose normal.   Eyes: EOM are normal.   Cardiovascular:  Intact distal pulses.            Pulmonary/Chest: Effort normal. No respiratory distress.   Neurological: She is alert and oriented to person, place, and time.   Psychiatric: She has a normal mood and affect. Her behavior is normal. Judgment and thought content normal.         Right Shoulder Exam     Inspection/Observation   Swelling: absent  Bruising: absent  Scars: absent  Deformity: absent  Scapular Winging: absent  Scapular Dyskinesia: negative  Atrophy: absent    Tenderness   The patient is tender to palpation of the biceps tendon.    Range of Motion   Active abduction:  160   Passive abduction:  170   Forward Flexion:  170   Forward Elevation: 170  External Rotation 0 degrees:  60   Internal rotation 0 degrees:  Mid thoracic     Tests & Signs   Apprehension: positive  Cross arm: negative  Merritt test: negative  Impingement: positive  Rotator Cuff Painful Arc/Range: moderate  Lift Off Sign: negative  Belly Press: negative  Active Compression Test (Mosby's Sign): positive  Speed's Test: positive  Anterior Drawer Test: 1+   Posterior Drawer Test: 2+  Relocation 90 degrees: postive  Relocation > 90 degrees: positive  Bear Hug: negative  Jerk Test: postive    Other   Sensation: normal    Comments:  Positive D-JEANETH and Whipple with Positive Modifications    Left Shoulder Exam     Inspection/Observation    Swelling: absent  Bruising: absent  Scars: absent  Deformity: absent  Scapular Winging: absent  Scapular Dyskinesia: negative  Atrophy: absent    Tenderness   The patient is experiencing no tenderness.     Range of Motion   Active abduction:  170   Passive abduction:  170   Forward Flexion:  180   Forward Elevation: 180  External Rotation 0 degrees:  60   Internal rotation 0 degrees:  Mid thoracic     Tests & Signs   Apprehension: negative  Anterior Drawer Test: 0  Posterior Drawer Test: 0  Relocation 90 degrees: negative  Relocation > 90 degrees: negative  Jerk Test: negative    Other   Sensation: normal       Muscle Strength   Right Upper Extremity   Shoulder Abduction: 5/5   Shoulder Internal Rotation: 5/5   Shoulder External Rotation: 5/5   Supraspinatus: 5/5   Subscapularis: 5/5   Biceps: 5/5   Left Upper Extremity  Shoulder Abduction: 5/5   Shoulder Internal Rotation: 5/5   Shoulder External Rotation: 5/5   Supraspinatus: 5/5   Subscapularis: 5/5   Biceps: 5/5     Vascular Exam     Right Pulses      Radial:                    2+      Left Pulses      Radial:                    2+      Capillary Refill  Right Hand: normal capillary refill  Left Hand: normal capillary refill            Imaging:   X-rays of the right shoulder from 04/08/2025 personally viewed by me on that day.  These include AP, Grashey, scapular Y, axillary lateral.  Glenohumeral joint is reduced.  No fracture.  No bony abnormality.      MRI of the right shoulder from 08/23/2023 personally viewed by me 04/08/2025.  Evidence of posterior shoulder impingement with partial articular sided tear of the infraspinatus and cystic changes within the posterior aspect of the humeral head.  Subacromial bursitis.    MR arthrogram of the right shoulder from 12/01/2023 personally viewed by me 04/08/2025.  There is a SLAP tear and posterior labral tear.  Remainder of the labrum appears intact.  Large and patulous capsule.      Assessment:   Estela Anderson is  a 24 y.o. female with right shoulder superior labral tear and posterior labral tear  1. Superior labrum anterior-to-posterior (SLAP) tear of right shoulder        2. Right shoulder pain, unspecified chronicity  X-ray Shoulder 2 or More Views Right      3. Acute pain of right shoulder  MRI Shoulder Without Contrast Right      4. Glenoid labral tear, right, initial encounter                Plan:       The diagnosis and treatment options were discussed at length with the patient all her questions were answered.  I showed her the x-rays and the old MRI and reviewed the findings with her.  She has a posterior labral tear with a superior labral tear in his shoulder as well as signs of posterior shoulder impingement.  She has failed extensive courses of nonoperative management including both physical therapy and corticosteroid injections.  She is interested in more definitive treatment, however we would like to wait till after mid September when she is getting .  I explained that surgical intervention would be right shoulder arthroscopy with posterior labral repair and SLAP repair versus open subpectoral biceps tenodesis depending on the quality of the long head of biceps tendon.  The procedure, risks, benefits, alternatives, and rehabilitation were discussed at length and all her questions were answered.  After this discussion she would like to proceed with surgery in late fall or early winter of 2025.  We will obtain a repeat MRI in August or September she will return to clinic in late September after her wedding.        This note was generated with the assistance of ambient listening technology. Verbal consent was obtained by the patient and accompanying visitor(s) for the recording of patient appointment to facilitate this note. I attest to having reviewed and edited the generated note for accuracy, though some syntax or spelling errors may persist. Please contact the author of this note for any  clarification.                         [1]   Current Outpatient Medications on File Prior to Visit   Medication Sig Dispense Refill    diclofenac (VOLTAREN) 75 MG EC tablet Take 1 tablet (75 mg total) by mouth 2 (two) times daily as needed (shoulder joint pain). 60 tablet 2    norethindrone-e.estradioL-iron (BLISOVI 24 FE) 1 mg-20 mcg (24)/75 mg (4) per tablet Take 1 tablet by mouth once daily. 90 tablet 3    tirzepatide, weight loss, (ZEPBOUND) 2.5 mg/0.5 mL PnIj Inject 2.5 mg into the skin every 7 days. (Patient not taking: Reported on 2025) 2 mL 0    tirzepatide, weight loss, (ZEPBOUND) 5 mg/0.5 mL PnIj Inject 5 mg into the skin every 7 days. (Patient not taking: Reported on 2025) 2 mL 1     No current facility-administered medications on file prior to visit.   [2]   Social History  Socioeconomic History    Marital status: Single   Tobacco Use    Smoking status: Never     Passive exposure: Never    Smokeless tobacco: Never   Substance and Sexual Activity    Alcohol use: No    Drug use: No    Sexual activity: Yes     Partners: Male     Birth control/protection: None   Social History Narrative    ALL:[employer]enic[employer]    PMH:T&A , rare OM, last in , scarlet fever in 04    FH:Mother has a mild generalized anxiety disorder and hypertension; PGM has HTN and bipolar disease, PGGM had late onset diabetes mellitus; PGGF had AMI after 50; MGM has fibromyalgia, is a smoker and has alcoholism; MGF has prostate cancer; MGGF had colon cancer; MGGM  of an unknown cancer    SH:Intact family,  younger sibling, no smokers, one dog    School: A student, swim team                                     Social Drivers of Health     Financial Resource Strain: Low Risk  (3/14/2025)    Overall Financial Resource Strain (CARDIA)     Difficulty of Paying Living Expenses: Not very hard   Food Insecurity: No Food Insecurity (3/14/2025)    Hunger Vital Sign     Worried About Running Out of Food in the Last Year: Never true     Ran Out  of Food in the Last Year: Never true   Transportation Needs: No Transportation Needs (3/14/2025)    PRAPARE - Transportation     Lack of Transportation (Medical): No     Lack of Transportation (Non-Medical): No   Physical Activity: Sufficiently Active (3/14/2025)    Exercise Vital Sign     Days of Exercise per Week: 5 days     Minutes of Exercise per Session: 30 min   Stress: Stress Concern Present (3/14/2025)    [employer] - Occupational Stress Questionnaire     Feeling of Stress : To some extent   Housing Stability: Low Risk  (3/14/2025)    Housing Stability Vital Sign     Unable to Pay for Housing in the Last Year: No     Number of Times Moved in the Last Year: 0     Homeless in the Last Year: No

## 2025-05-27 ENCOUNTER — TELEPHONE (OUTPATIENT)
Dept: INTERNAL MEDICINE | Facility: CLINIC | Age: 25
End: 2025-05-27
Payer: COMMERCIAL

## 2025-05-27 NOTE — TELEPHONE ENCOUNTER
----- Message from Pharmacist Manju sent at 5/27/2025  8:49 AM CDT -----  Regarding: Zepbound  Hello,Pt has not been in contact with [facility] for 2 months for medication. Per protocol, prescriptions will be discontinued and enrollment with [facility] will be closed out. She asked to transition to Zepbound in March and never started. Last dispense date was on 12/30/2024 for Wegovy.

## 2025-05-27 NOTE — TELEPHONE ENCOUNTER
Patient has been closed out of [facility] MTM program due to [facility] being unable to fill GLP-1 medications that were prescribed. Programs updated, medications discontinued, and future visits canceled. Pt is welcome to re-enroll in  MTM program at later date if they would like.

## 2025-06-04 ENCOUNTER — PATIENT MESSAGE (OUTPATIENT)
Dept: PAIN MEDICINE | Facility: CLINIC | Age: 25
End: 2025-06-04
Payer: COMMERCIAL

## 2025-06-06 ENCOUNTER — TELEPHONE (OUTPATIENT)
Dept: PAIN MEDICINE | Facility: CLINIC | Age: 25
End: 2025-06-06
Payer: COMMERCIAL

## 2025-06-06 DIAGNOSIS — G89.29 CHRONIC RIGHT SHOULDER PAIN: Primary | ICD-10-CM

## 2025-06-06 DIAGNOSIS — S43.431A LABRAL TEAR OF SHOULDER, RIGHT, INITIAL ENCOUNTER: ICD-10-CM

## 2025-06-06 DIAGNOSIS — M25.511 CHRONIC RIGHT SHOULDER PAIN: Primary | ICD-10-CM

## 2025-06-19 ENCOUNTER — PATIENT MESSAGE (OUTPATIENT)
Dept: SPORTS MEDICINE | Facility: CLINIC | Age: 25
End: 2025-06-19
Payer: COMMERCIAL

## 2025-07-01 ENCOUNTER — CLINICAL SUPPORT (OUTPATIENT)
Dept: PAIN MEDICINE | Facility: CLINIC | Age: 25
End: 2025-07-01
Payer: COMMERCIAL

## 2025-07-01 VITALS
SYSTOLIC BLOOD PRESSURE: 132 MMHG | WEIGHT: 230.63 LBS | HEART RATE: 92 BPM | HEIGHT: 70 IN | BODY MASS INDEX: 33.02 KG/M2 | DIASTOLIC BLOOD PRESSURE: 87 MMHG

## 2025-07-01 DIAGNOSIS — G89.29 CHRONIC RIGHT SHOULDER PAIN: ICD-10-CM

## 2025-07-01 DIAGNOSIS — M25.511 CHRONIC RIGHT SHOULDER PAIN: ICD-10-CM

## 2025-07-01 DIAGNOSIS — S43.431A LABRAL TEAR OF SHOULDER, RIGHT, INITIAL ENCOUNTER: Primary | ICD-10-CM

## 2025-07-01 PROCEDURE — 20600 DRAIN/INJ JOINT/BURSA W/O US: CPT | Mod: RT,S$GLB,, | Performed by: STUDENT IN AN ORGANIZED HEALTH CARE EDUCATION/TRAINING PROGRAM

## 2025-07-01 PROCEDURE — 99999 PR PBB SHADOW E&M-EST. PATIENT-LVL III: CPT | Mod: PBBFAC,,, | Performed by: STUDENT IN AN ORGANIZED HEALTH CARE EDUCATION/TRAINING PROGRAM

## 2025-07-01 PROCEDURE — 76942 ECHO GUIDE FOR BIOPSY: CPT | Mod: S$GLB,,, | Performed by: STUDENT IN AN ORGANIZED HEALTH CARE EDUCATION/TRAINING PROGRAM

## 2025-07-01 PROCEDURE — 99499 UNLISTED E&M SERVICE: CPT | Mod: S$GLB,,, | Performed by: STUDENT IN AN ORGANIZED HEALTH CARE EDUCATION/TRAINING PROGRAM

## 2025-07-01 RX ORDER — TRIAMCINOLONE ACETONIDE 40 MG/ML
40 INJECTION, SUSPENSION INTRA-ARTICULAR; INTRAMUSCULAR
Status: COMPLETED | OUTPATIENT
Start: 2025-07-01 | End: 2025-07-01

## 2025-07-01 RX ADMIN — TRIAMCINOLONE ACETONIDE 40 MG: 40 INJECTION, SUSPENSION INTRA-ARTICULAR; INTRAMUSCULAR at 01:07

## 2025-07-01 NOTE — PROGRESS NOTES
Patient Name: Estela Anderson  MRN: [id number]    INFORMED CONSENT: The procedure, risks, benefits and options were discussed with patient. There are no contraindications to the procedure. The patient expressed understanding and agreed to proceed. The personnel performing the procedure was discussed. I verify that I personally obtained Estela's consent prior to the start of the procedure and the signed consent can be found on the patient's chart.    Procedure Date: 07/01/2025    Anesthesia: Topical    Pre Procedure diagnosis: * No surgery found *  No diagnosis found.  Post-Procedure diagnosis: SAME      Moderate Sedation: None  Sedation time: Less than 15 minutes    PPROCEDURE: RIGHT rotator cuff interval injection posterior approach    DESCRIPTION OF PROCEDURE: The patient was brought to the procedure room and time out was performed. The patient was remained in supine/head up position. The area over the RIGHT shoulder was prepped in usual sterile fashion using chlorhexidine prep.   This was followed by advancement of a 22-gauge needle into the area of the rotator cuff interval.  Once encountered, after negative aspiration, and no paresthesias, 1 mL of 40mg/mL Kenalog + 4 mL of 0.25% Bupivicaine (total volume of 5 mL) was injected into the area.  Needle was withdrawn and a sterile band-aid applied to the skin.  Blood Loss: Nill  Specimen: None      Chelo López MD

## 2025-07-21 ENCOUNTER — HOSPITAL ENCOUNTER (OUTPATIENT)
Dept: RADIOLOGY | Facility: HOSPITAL | Age: 25
Discharge: HOME | End: 2025-07-21
Attending: STUDENT IN AN ORGANIZED HEALTH CARE EDUCATION/TRAINING PROGRAM
Payer: COMMERCIAL

## 2025-07-21 DIAGNOSIS — M25.511 ACUTE PAIN OF RIGHT SHOULDER: ICD-10-CM

## 2025-07-21 PROCEDURE — 73221 MRI JOINT UPR EXTREM W/O DYE: CPT | Mod: TC,RT

## 2025-07-21 PROCEDURE — 73221 MRI JOINT UPR EXTREM W/O DYE: CPT | Mod: 26,RT,, | Performed by: RADIOLOGY

## 2025-07-23 ENCOUNTER — OFFICE VISIT (OUTPATIENT)
Dept: SPORTS MEDICINE | Facility: CLINIC | Age: 25
End: 2025-07-23
Payer: COMMERCIAL

## 2025-07-23 VITALS
HEART RATE: 118 BPM | SYSTOLIC BLOOD PRESSURE: 143 MMHG | WEIGHT: 229.25 LBS | BODY MASS INDEX: 32.9 KG/M2 | DIASTOLIC BLOOD PRESSURE: 102 MMHG

## 2025-07-23 DIAGNOSIS — S43.431A SUPERIOR LABRUM ANTERIOR-TO-POSTERIOR (SLAP) TEAR OF RIGHT SHOULDER: Primary | ICD-10-CM

## 2025-07-23 DIAGNOSIS — S43.431A GLENOID LABRAL TEAR, RIGHT, INITIAL ENCOUNTER: ICD-10-CM

## 2025-07-23 PROCEDURE — 3077F SYST BP >= 140 MM HG: CPT | Mod: CPTII,S$GLB,, | Performed by: STUDENT IN AN ORGANIZED HEALTH CARE EDUCATION/TRAINING PROGRAM

## 2025-07-23 PROCEDURE — 1160F RVW MEDS BY RX/DR IN RCRD: CPT | Mod: CPTII,S$GLB,, | Performed by: STUDENT IN AN ORGANIZED HEALTH CARE EDUCATION/TRAINING PROGRAM

## 2025-07-23 PROCEDURE — 99999 PR PBB SHADOW E&M-EST. PATIENT-LVL III: CPT | Mod: PBBFAC,,, | Performed by: STUDENT IN AN ORGANIZED HEALTH CARE EDUCATION/TRAINING PROGRAM

## 2025-07-23 PROCEDURE — 3080F DIAST BP >= 90 MM HG: CPT | Mod: CPTII,S$GLB,, | Performed by: STUDENT IN AN ORGANIZED HEALTH CARE EDUCATION/TRAINING PROGRAM

## 2025-07-23 PROCEDURE — 99215 OFFICE O/P EST HI 40 MIN: CPT | Mod: S$GLB,,, | Performed by: STUDENT IN AN ORGANIZED HEALTH CARE EDUCATION/TRAINING PROGRAM

## 2025-07-23 PROCEDURE — 3008F BODY MASS INDEX DOCD: CPT | Mod: CPTII,S$GLB,, | Performed by: STUDENT IN AN ORGANIZED HEALTH CARE EDUCATION/TRAINING PROGRAM

## 2025-07-23 PROCEDURE — 1159F MED LIST DOCD IN RCRD: CPT | Mod: CPTII,S$GLB,, | Performed by: STUDENT IN AN ORGANIZED HEALTH CARE EDUCATION/TRAINING PROGRAM

## 2025-07-23 RX ORDER — METHOCARBAMOL 500 MG/1
500 TABLET, FILM COATED ORAL 4 TIMES DAILY
Qty: 60 TABLET | Refills: 0 | Status: SHIPPED | OUTPATIENT
Start: 2025-07-23

## 2025-07-24 NOTE — PROGRESS NOTES
Subjective:          Chief Complaint: Estela Anderson is a 25 y.o. female who had concerns including Pain of the Right Shoulder.    HPI 7/23/2025  History of Present Illness    CHIEF COMPLAINT:  - Left shoulder instability    HPI:  Client presents for follow-up regarding a shoulder injury and to discuss MRI results. An MRI revealed a tear at the top of the labrum (SLAP tear) extending to the back of the shoulder. She reports pain, particularly in the posterior shoulder region, radiating up into the neck. Pain is described as an ache that worsens after a full day of work. She experiences constant tension, which exacerbates her discomfort. 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 She is planning to get  in September and is considering scheduling surgery for late November or early December, before January 1st. She wants to coordinate the surgery timing with her [facility] leave. She inquires about using gabapentin for pain management.    Client's bursitis in the shoulder has resolved.    WORK STATUS:  - Works as a nurse in the free post pack area at [employer]  - Job requires ability to perform CPR, including chest compressions  - May need limited duty or desk work for a period after surgery before returning to full duties  - Experiences pain and discomfort after working all day, extending into neck area     HPI 4/8/2025  History of Present Illness    CHIEF COMPLAINT:  - Right shoulder pain    HPI:  Client presents with right shoulder pain ongoing for approximately 2.5 years. Pain is primarily concentrated in the front of the shoulder and sometimes radiates up the neck when turning the head. It feels restricted with upward reaching and forward movements. Pain severity ranges from 2/10 at rest to 10/10 pain with movement. She rates shoulder function as 50% normal on a scale of 0-100.    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 Currently working at [employer] to [employer], which involves less lifting but still requires moving and assisting older patients. She reports difficulty carrying a 6-month-old child due to pain, stating inability to remove shirt over head when pain is at its worst. The condition affects daily activities consistently.    Previous treatments include ineffective PT for five months and injections. She saw Dr. Moore two years ago, who recommended PT.    She mentions a history of labral tear surgery in the hip at age 19 due to a dance background, but reports the hip is currently fine. She denies any particular injury causing the shoulder pain, numbness or tingling in the right hand, dislocation of joints, or being super flexible and stretchy.    PREVIOUS TREATMENTS:  - PT: Approximately 5 months, provided no relief  - Injection: Administered by Dr. López at [place], provided no benefit    WORK STATUS:  - Works as a nurse at [employer]  - Duties include pre and post-op care, and sometimes intra-endoscopic procedures  - Job involves lifting and moving patients, particularly older patients needing assistance  - Reports difficulty with reaching and lifting, causing significant right shoulder pain  - Pain affects ability to perform daily tasks at work  - Work duties exacerbate shoulder pain               Past Medical History:   Diagnosis Date    Allergy     Anxiety        Medications Ordered Prior to Encounter[1]    Past Surgical History:   Procedure Laterality Date    ADENOIDECTOMY      HIP SURGERY      INJECTION OF JOINT Right 2/7/2025    Procedure: Right GH intra-articular injection;  Surgeon: Chelo López MD;  Location: [facility];  Service: Pain Management;  Laterality: Right;  no AC    sinus sx      TONSILLECTOMY         Family History   Problem Relation Name Age of Onset    Bipolar disorder Paternal Grandmother      Hypertension Paternal Grandmother      Mental illness Paternal Grandmother          bipolar     Fibromyalgia Maternal Grandmother      Alcohol abuse Maternal Grandmother      Cancer Maternal Grandfather          prostate cancer    Meningitis Maternal Grandfather      Hypertension Mother Indy     Anxiety disorder Mother Indy     Diabetes Other PGGM         T2DM    Heart disease Other PGGF         AMI death >51yo    Cancer Other MGGM     Cancer Other MGGF         colon cancer    Uterine cancer Neg Hx      Ovarian cancer Neg Hx      Colon cancer Neg Hx      Breast cancer Neg Hx         Social History[2]   Review of Systems   Constitutional: Negative.   HENT: Negative.     Eyes: Negative.    Cardiovascular: Negative.    Respiratory: Negative.     Endocrine: Negative.    Hematologic/Lymphatic: Negative.    Skin: Negative.    Musculoskeletal:  Positive for joint pain and muscle weakness.   Neurological: Negative.    Psychiatric/Behavioral: Negative.     Allergic/Immunologic: Negative.        Pain Related Questions  Over the past 3 days, what was your average pain during activity? (I.e. running, jogging, walking, climbing stairs, getting dressed, ect.): 7  Over the past 3 days, what was your highest pain level?: 10  Over the past 3 days, what was your lowest pain level? : 4    Other  How many nights a week are you awakened by your affected body part?: 2      Objective:        General: Estela is well-developed, well-nourished, appears stated age, in no acute distress, alert and oriented to time, place and person.     General    Nursing note and vitals reviewed.  Constitutional: She is oriented to person, place, and time. She appears well-developed and well-nourished. No distress.   HENT:   Head: Normocephalic and atraumatic.   Nose: Nose normal.   Eyes: EOM are normal.   Cardiovascular:  Intact distal pulses.            Pulmonary/Chest: Effort normal. No respiratory distress.   Neurological: She is alert and oriented to person, place, and time.   Psychiatric: She has a normal mood and affect. Her behavior is normal.  Judgment and thought content normal.         Right Shoulder Exam     Inspection/Observation   Swelling: absent  Bruising: absent  Scars: absent  Deformity: absent  Scapular Winging: absent  Scapular Dyskinesia: negative  Atrophy: absent    Tenderness   The patient is tender to palpation of the biceps tendon.    Range of Motion   Active abduction:  160   Passive abduction:  170   Forward Flexion:  170   Forward Elevation: 170  External Rotation 0 degrees:  60   Internal rotation 0 degrees:  Mid thoracic     Tests & Signs   Apprehension: positive  Cross arm: negative  Merritt test: negative  Impingement: positive  Rotator Cuff Painful Arc/Range: moderate  Lift Off Sign: negative  Belly Press: negative  Active Compression Test (Durham's Sign): positive  Speed's Test: positive  Anterior Drawer Test: 1+   Posterior Drawer Test: 2+  Relocation 90 degrees: postive  Relocation > 90 degrees: positive  Bear Hug: negative  Jerk Test: postive    Other   Sensation: normal    Comments:  Positive D-JEANETH and Whipple with Positive Modifications    Left Shoulder Exam     Inspection/Observation   Swelling: absent  Bruising: absent  Scars: absent  Deformity: absent  Scapular Winging: absent  Scapular Dyskinesia: negative  Atrophy: absent    Tenderness   The patient is experiencing no tenderness.     Range of Motion   Active abduction:  170   Passive abduction:  170   Forward Flexion:  180   Forward Elevation: 180  External Rotation 0 degrees:  60   Internal rotation 0 degrees:  Mid thoracic     Tests & Signs   Apprehension: negative  Anterior Drawer Test: 0  Posterior Drawer Test: 0  Relocation 90 degrees: negative  Relocation > 90 degrees: negative  Jerk Test: negative    Other   Sensation: normal       Muscle Strength   Right Upper Extremity   Shoulder Abduction: 5/5   Shoulder Internal Rotation: 5/5   Shoulder External Rotation: 5/5   Supraspinatus: 5/5   Subscapularis: 5/5   Biceps: 5/5   Left Upper Extremity  Shoulder Abduction:  5/5   Shoulder Internal Rotation: 5/5   Shoulder External Rotation: 5/5   Supraspinatus: 5/5   Subscapularis: 5/5   Biceps: 5/5     Vascular Exam     Right Pulses      Radial:                    2+      Left Pulses      Radial:                    2+      Capillary Refill  Right Hand: normal capillary refill  Left Hand: normal capillary refill            Imaging:   X-rays of the right shoulder from 04/08/2025 personally viewed by me on that day.  These include AP, Grashey, scapular Y, axillary lateral.  Glenohumeral joint is reduced.  No fracture.  No bony abnormality.      MRI of the right shoulder from 08/23/2023 personally viewed by me 04/08/2025.  Evidence of posterior shoulder impingement with partial articular sided tear of the infraspinatus and cystic changes within the posterior aspect of the humeral head.  Subacromial bursitis.    MR arthrogram of the right shoulder from 12/01/2023 personally viewed by me 04/08/2025.  There is a SLAP tear and posterior labral tear.  Remainder of the labrum appears intact.  Large and patulous capsule.    MRI of the right shoulder from 07/21/2025 personally viewed by me 07/23/2025.  SLAP tear and posterior labral tear, similar to prior MRI from 12/01/2023.  Posterior subluxation of the humeral head relative to the glenoid.  Rotator cuff is intact.  No bursitis.      Assessment:   Estela Anderson is a 25 y.o. female with right shoulder superior labral tear and posterior labral tear  1. Superior labrum anterior-to-posterior (SLAP) tear of right shoulder        2. Glenoid labral tear, right, initial encounter                Plan:       The diagnosis treatment options were discussed with the patient all her questions were answered I showed her the MRI and reviewed the findings with her.  Unfortunately, she has had extensive nonoperative management in his still quite symptomatic.  We discussed the we can continue with this we will proceed with surgical intervention.  I explained  surgical intervention would entail right shoulder arthroscopy with posterior labral repair and SLAP repair versus open subpectoral biceps tenodesis.  The procedures, risks, benefits, alternatives, rehabilitation were discussed at length all her questions were answered after discussion, she elected proceed with surgery.  However, she would like to wait until November or December of 2025.  She will contact the clinic with a date of when she would like to proceed.  She is not need preoperative clearance.      This note was generated with the assistance of ambient listening technology. Verbal consent was obtained by the patient and accompanying visitor(s) for the recording of patient appointment to facilitate this note. I attest to having reviewed and edited the generated note for accuracy, though some syntax or spelling errors may persist. Please contact the author of this note for any clarification.                           [1]   Current Outpatient Medications on File Prior to Visit   Medication Sig Dispense Refill    norethindrone-e.estradioL-iron (BLISOVI 24 FE) 1 mg-20 mcg (24)/75 mg (4) per tablet Take 1 tablet by mouth once daily. 90 tablet 3    diclofenac (VOLTAREN) 75 MG EC tablet Take 1 tablet (75 mg total) by mouth 2 (two) times daily as needed (shoulder joint pain). (Patient not taking: Reported on 7/23/2025) 60 tablet 2     No current facility-administered medications on file prior to visit.   [2]   Social History  Socioeconomic History    Marital status: Single   Tobacco Use    Smoking status: Never     Passive exposure: Never    Smokeless tobacco: Never   Substance and Sexual Activity    Alcohol use: No    Drug use: No    Sexual activity: Yes     Partners: Male     Birth control/protection: None   Social History Narrative    ALL:[employer]    PMH:T&A , rare OM, last in 07, scarlet fever in 04    FH:Mother has a mild generalized anxiety disorder and hypertension; PGM has HTN and bipolar disease, PGGM had  late onset diabetes mellitus; PGGF had AMI after 50; MGM has fibromyalgia, is a smoker and has alcoholism; MGF has prostate cancer; MGGF had colon cancer; MGGM  of an unknown cancer    SH:Intact family,  younger sibling, no smokers, one dog    School: A student, swim team                                     Social Drivers of Health     Financial Resource Strain: Low Risk  (3/14/2025)    Overall Financial Resource Strain (CARDIA)     Difficulty of Paying Living Expenses: Not very hard   Food Insecurity: No Food Insecurity (3/14/2025)    Hunger Vital Sign     Worried About Running Out of Food in the Last Year: Never true     Ran Out of Food in the Last Year: Never true   Transportation Needs: No Transportation Needs (3/14/2025)    PRAPARE - Transportation     Lack of Transportation (Medical): No     Lack of Transportation (Non-Medical): No   Physical Activity: Sufficiently Active (3/14/2025)    Exercise Vital Sign     Days of Exercise per Week: 5 days     Minutes of Exercise per Session: 30 min   Stress: Stress Concern Present (3/14/2025)    [employer] [place] [employer] - Occupational Stress Questionnaire     Feeling of Stress : To some extent   Housing Stability: Low Risk  (3/14/2025)    Housing Stability Vital Sign     Unable to Pay for Housing in the Last Year: No     Number of Times Moved in the Last Year: 0     Homeless in the Last Year: No

## 2025-07-28 PROBLEM — Z3A.33 33 WEEKS GESTATION OF PREGNANCY: Status: RESOLVED | Noted: 2024-08-19 | Resolved: 2025-07-28

## 2025-07-29 ENCOUNTER — PATIENT MESSAGE (OUTPATIENT)
Dept: SPORTS MEDICINE | Facility: CLINIC | Age: 25
End: 2025-07-29
Payer: COMMERCIAL

## 2025-08-03 ENCOUNTER — OFFICE VISIT (OUTPATIENT)
Dept: URGENT CARE | Facility: CLINIC | Age: 25
End: 2025-08-03
Payer: COMMERCIAL

## 2025-08-03 VITALS
BODY MASS INDEX: 32.78 KG/M2 | TEMPERATURE: 98 F | SYSTOLIC BLOOD PRESSURE: 142 MMHG | WEIGHT: 229 LBS | HEART RATE: 84 BPM | OXYGEN SATURATION: 99 % | HEIGHT: 70 IN | DIASTOLIC BLOOD PRESSURE: 92 MMHG | RESPIRATION RATE: 20 BRPM

## 2025-08-03 DIAGNOSIS — H66.91 RIGHT OTITIS MEDIA, UNSPECIFIED OTITIS MEDIA TYPE: Primary | ICD-10-CM

## 2025-08-03 PROCEDURE — 99204 OFFICE O/P NEW MOD 45 MIN: CPT | Mod: S$GLB,,, | Performed by: NURSE PRACTITIONER

## 2025-08-03 RX ORDER — CEFDINIR 300 MG/1
300 CAPSULE ORAL 2 TIMES DAILY
Qty: 14 CAPSULE | Refills: 0 | Status: SHIPPED | OUTPATIENT
Start: 2025-08-03 | End: 2025-08-10

## 2025-08-03 NOTE — PROGRESS NOTES
"Subjective:      Patient ID: Estela Anderson is a 25 y.o. female.    Vitals:  height is 5' 10" (1.778 m) and weight is 103.9 kg (229 lb). Her oral temperature is 97.6 °F (36.4 °C). Her blood pressure is 142/92 (abnormal) and her pulse is 84. Her respiration is 20 and oxygen saturation is 99%.     Chief Complaint: Nasal Congestion (Really bad ear pain, sinus headache, feels like sinus infection. - Entered by patient)    Estela Anderson is a 25 year old female presenting to the clinic with a five day history of congestion, sinus pressure, cough. She began having right ear pain yesterday. She has been taking dayquil without significant improvement in symptoms.         Constitution: Negative.   HENT:  Positive for ear pain, congestion and sinus pressure.    Neck: neck negative.   Eyes: Negative.    Respiratory:  Positive for cough.    Gastrointestinal: Negative.    Genitourinary: Negative.    Musculoskeletal: Negative.    Neurological: Negative.       Objective:     Physical Exam   Constitutional: She is oriented to person, place, and time. She appears well-developed. She is cooperative.  Non-toxic appearance. She does not appear ill. No distress.   HENT:   Head: Normocephalic and atraumatic.   Ears:   Right Ear: Hearing, external ear and ear canal normal. Tympanic membrane is erythematous. A middle ear effusion is present.   Left Ear: Hearing, tympanic membrane, external ear and ear canal normal.   Nose: Nose normal. No mucosal edema, rhinorrhea or nasal deformity. No epistaxis. Right sinus exhibits no maxillary sinus tenderness and no frontal sinus tenderness. Left sinus exhibits no maxillary sinus tenderness and no frontal sinus tenderness.   Mouth/Throat: Uvula is midline, oropharynx is clear and moist and mucous membranes are normal. No trismus in the jaw. Normal dentition. No uvula swelling. No oropharyngeal exudate, posterior oropharyngeal edema or posterior oropharyngeal erythema.   Eyes: Conjunctivae and lids are " normal. No scleral icterus.   Neck: Trachea normal and phonation normal. Neck supple. No edema present. No erythema present. No neck rigidity present.   Cardiovascular: Normal rate, regular rhythm, normal heart sounds and normal pulses.   Pulmonary/Chest: Effort normal and breath sounds normal. No respiratory distress. She has no decreased breath sounds. She has no rhonchi.   Abdominal: Normal appearance.   Musculoskeletal: Normal range of motion.         General: No deformity. Normal range of motion.   Neurological: She is alert and oriented to person, place, and time. She exhibits normal muscle tone. Coordination normal.   Skin: Skin is warm, dry, intact, not diaphoretic and not pale.   Psychiatric: Her speech is normal and behavior is normal. Judgment and thought content normal.   Nursing note and vitals reviewed.      Assessment:     1. Right otitis media, unspecified otitis media type        Plan:   The patient has right AOM without perforation or findings concerning for mastoiditis. Will start omnicef since she is allergic to PCN. Also instructed her to begin using antihistamine and nasal spray in addition to decongestant. Go to ER for any worsening symptoms.   Otherwise likely a viral URI.     Right otitis media, unspecified otitis media type    Other orders  -     cefdinir (OMNICEF) 300 MG capsule; Take 1 capsule (300 mg total) by mouth 2 (two) times daily. for 7 days  Dispense: 14 capsule; Refill: 0

## 2025-08-04 DIAGNOSIS — S43.431A GLENOID LABRAL TEAR, RIGHT, INITIAL ENCOUNTER: Primary | ICD-10-CM

## 2025-08-04 DIAGNOSIS — S43.431A SUPERIOR LABRUM ANTERIOR-TO-POSTERIOR (SLAP) TEAR OF RIGHT SHOULDER: ICD-10-CM

## 2025-08-04 PROBLEM — O13.3 GESTATIONAL HYPERTENSION, THIRD TRIMESTER: Status: RESOLVED | Noted: 2024-09-17 | Resolved: 2025-08-04

## 2025-08-13 ENCOUNTER — PATIENT MESSAGE (OUTPATIENT)
Dept: SPORTS MEDICINE | Facility: CLINIC | Age: 25
End: 2025-08-13
Payer: COMMERCIAL

## 2025-08-14 ENCOUNTER — OFFICE VISIT (OUTPATIENT)
Dept: PAIN MEDICINE | Facility: CLINIC | Age: 25
End: 2025-08-14
Payer: COMMERCIAL

## 2025-08-14 DIAGNOSIS — M25.511 CHRONIC RIGHT SHOULDER PAIN: Primary | ICD-10-CM

## 2025-08-14 DIAGNOSIS — M79.18 MYOFASCIAL PAIN: ICD-10-CM

## 2025-08-14 DIAGNOSIS — G89.29 CHRONIC RIGHT SHOULDER PAIN: Primary | ICD-10-CM

## 2025-08-14 PROCEDURE — 1159F MED LIST DOCD IN RCRD: CPT | Mod: CPTII,95,, | Performed by: NURSE PRACTITIONER

## 2025-08-14 PROCEDURE — 98005 SYNCH AUDIO-VIDEO EST LOW 20: CPT | Mod: 95,,, | Performed by: NURSE PRACTITIONER

## 2025-08-14 PROCEDURE — 1160F RVW MEDS BY RX/DR IN RCRD: CPT | Mod: CPTII,95,, | Performed by: NURSE PRACTITIONER

## 2025-08-14 RX ORDER — CYCLOBENZAPRINE HCL 5 MG
5 TABLET ORAL 3 TIMES DAILY PRN
Qty: 30 TABLET | Refills: 0 | Status: SHIPPED | OUTPATIENT
Start: 2025-08-14 | End: 2025-08-25

## 2025-08-14 RX ORDER — CELECOXIB 100 MG/1
100 CAPSULE ORAL 2 TIMES DAILY PRN
Qty: 60 CAPSULE | Refills: 0 | Status: SHIPPED | OUTPATIENT
Start: 2025-08-14

## 2025-08-19 ENCOUNTER — PATIENT MESSAGE (OUTPATIENT)
Dept: SPORTS MEDICINE | Facility: CLINIC | Age: 25
End: 2025-08-19
Payer: COMMERCIAL

## 2025-08-29 ENCOUNTER — PATIENT MESSAGE (OUTPATIENT)
Dept: ADMINISTRATIVE | Facility: HOSPITAL | Age: 25
End: 2025-08-29
Payer: COMMERCIAL

## 2025-09-04 ENCOUNTER — PATIENT MESSAGE (OUTPATIENT)
Dept: PAIN MEDICINE | Facility: CLINIC | Age: 25
End: 2025-09-04
Payer: COMMERCIAL

## 2025-09-04 RX ORDER — ONDANSETRON 4 MG/1
4 TABLET, ORALLY DISINTEGRATING ORAL EVERY 8 HOURS PRN
Qty: 30 TABLET | Refills: 1 | Status: SHIPPED | OUTPATIENT
Start: 2025-09-04